# Patient Record
Sex: MALE | Race: WHITE | URBAN - NONMETROPOLITAN AREA
[De-identification: names, ages, dates, MRNs, and addresses within clinical notes are randomized per-mention and may not be internally consistent; named-entity substitution may affect disease eponyms.]

---

## 2018-05-19 ENCOUNTER — APPOINTMENT (OUTPATIENT)
Dept: CT IMAGING | Facility: OTHER | Age: 73
End: 2018-05-19
Payer: MEDICARE

## 2018-05-19 ENCOUNTER — APPOINTMENT (OUTPATIENT)
Dept: GENERAL RADIOLOGY | Facility: OTHER | Age: 73
End: 2018-05-19
Payer: MEDICARE

## 2018-05-19 ENCOUNTER — HOSPITAL ENCOUNTER (EMERGENCY)
Facility: OTHER | Age: 73
Discharge: OTHER INSTITUTION | End: 2018-05-19
Payer: MEDICARE

## 2018-05-19 ENCOUNTER — HOSPITAL ENCOUNTER (INPATIENT)
Facility: CLINIC | Age: 73
LOS: 2 days | Discharge: HOME | DRG: 071 | End: 2018-05-21
Attending: HOSPITALIST | Admitting: HOSPITALIST
Payer: MEDICARE

## 2018-05-19 VITALS
RESPIRATION RATE: 16 BRPM | DIASTOLIC BLOOD PRESSURE: 108 MMHG | WEIGHT: 226 LBS | HEIGHT: 68 IN | TEMPERATURE: 101.3 F | HEART RATE: 94 BPM | BODY MASS INDEX: 34.25 KG/M2 | SYSTOLIC BLOOD PRESSURE: 152 MMHG | OXYGEN SATURATION: 97 %

## 2018-05-19 DIAGNOSIS — G93.40 ENCEPHALOPATHY: Primary | ICD-10-CM

## 2018-05-19 DIAGNOSIS — R41.82 ALTERED MENTAL STATUS, UNSPECIFIED ALTERED MENTAL STATUS TYPE: ICD-10-CM

## 2018-05-19 DIAGNOSIS — R53.81 PHYSICAL DECONDITIONING: ICD-10-CM

## 2018-05-19 DIAGNOSIS — I63.9 CEREBROVASCULAR ACCIDENT (CVA), UNSPECIFIED MECHANISM (H): ICD-10-CM

## 2018-05-19 LAB
ALBUMIN UR-MCNC: NEGATIVE MG/DL
ANION GAP SERPL CALCULATED.3IONS-SCNC: 10 MMOL/L (ref 3–14)
APPEARANCE UR: CLEAR
BASOPHILS # BLD AUTO: 0.1 10E9/L (ref 0–0.2)
BASOPHILS NFR BLD AUTO: 0.7 %
BILIRUB UR QL STRIP: NEGATIVE
BUN SERPL-MCNC: 16 MG/DL (ref 7–25)
CALCIUM SERPL-MCNC: 9.8 MG/DL (ref 8.6–10.3)
CHLORIDE SERPL-SCNC: 105 MMOL/L (ref 98–107)
CO2 SERPL-SCNC: 23 MMOL/L (ref 21–31)
COLOR UR AUTO: YELLOW
CREAT SERPL-MCNC: 1.03 MG/DL (ref 0.7–1.3)
DIFFERENTIAL METHOD BLD: ABNORMAL
EOSINOPHIL # BLD AUTO: 0 10E9/L (ref 0–0.7)
EOSINOPHIL NFR BLD AUTO: 0.4 %
ERYTHROCYTE [DISTWIDTH] IN BLOOD BY AUTOMATED COUNT: 15.6 % (ref 10–15)
GFR SERPL CREATININE-BSD FRML MDRD: 71 ML/MIN/1.7M2
GLUCOSE SERPL-MCNC: 97 MG/DL (ref 70–105)
GLUCOSE UR STRIP-MCNC: NEGATIVE MG/DL
HCT VFR BLD AUTO: 34.8 % (ref 40–53)
HGB BLD-MCNC: 11.5 G/DL (ref 13.3–17.7)
HGB UR QL STRIP: NEGATIVE
IMM GRANULOCYTES # BLD: 0 10E9/L (ref 0–0.4)
IMM GRANULOCYTES NFR BLD: 0.2 %
KETONES UR STRIP-MCNC: ABNORMAL MG/DL
LEUKOCYTE ESTERASE UR QL STRIP: NEGATIVE
LYMPHOCYTES # BLD AUTO: 1.1 10E9/L (ref 0.8–5.3)
LYMPHOCYTES NFR BLD AUTO: 12.6 %
MCH RBC QN AUTO: 26.9 PG (ref 26.5–33)
MCHC RBC AUTO-ENTMCNC: 33 G/DL (ref 31.5–36.5)
MCV RBC AUTO: 82 FL (ref 78–100)
MONOCYTES # BLD AUTO: 0.5 10E9/L (ref 0–1.3)
MONOCYTES NFR BLD AUTO: 6.2 %
NEUTROPHILS # BLD AUTO: 6.7 10E9/L (ref 1.6–8.3)
NEUTROPHILS NFR BLD AUTO: 79.9 %
NITRATE UR QL: NEGATIVE
PH UR STRIP: NEGATIVE PH (ref 5–7)
PLATELET # BLD AUTO: 265 10E9/L (ref 150–450)
POTASSIUM SERPL-SCNC: 3.3 MMOL/L (ref 3.5–5.1)
RBC # BLD AUTO: 4.27 10E12/L (ref 4.4–5.9)
SODIUM SERPL-SCNC: 138 MMOL/L (ref 134–144)
SOURCE: ABNORMAL
SP GR UR STRIP: 1.02 (ref 1–1.03)
UROBILINOGEN UR STRIP-ACNC: NEGATIVE EU/DL (ref 0.2–1)
WBC # BLD AUTO: 8.4 10E9/L (ref 4–11)

## 2018-05-19 PROCEDURE — 81003 URINALYSIS AUTO W/O SCOPE: CPT

## 2018-05-19 PROCEDURE — 80048 BASIC METABOLIC PNL TOTAL CA: CPT

## 2018-05-19 PROCEDURE — 99285 EMERGENCY DEPT VISIT HI MDM: CPT | Mod: Z6

## 2018-05-19 PROCEDURE — 85025 COMPLETE CBC W/AUTO DIFF WBC: CPT

## 2018-05-19 PROCEDURE — 99285 EMERGENCY DEPT VISIT HI MDM: CPT | Mod: 25

## 2018-05-19 PROCEDURE — 93010 ELECTROCARDIOGRAM REPORT: CPT | Performed by: INTERNAL MEDICINE

## 2018-05-19 PROCEDURE — 96376 TX/PRO/DX INJ SAME DRUG ADON: CPT

## 2018-05-19 PROCEDURE — 12000000 ZZH R&B MED SURG/OB

## 2018-05-19 PROCEDURE — 96375 TX/PRO/DX INJ NEW DRUG ADDON: CPT

## 2018-05-19 PROCEDURE — 99223 1ST HOSP IP/OBS HIGH 75: CPT | Mod: AI | Performed by: HOSPITALIST

## 2018-05-19 PROCEDURE — 71046 X-RAY EXAM CHEST 2 VIEWS: CPT

## 2018-05-19 PROCEDURE — 96366 THER/PROPH/DIAG IV INF ADDON: CPT

## 2018-05-19 PROCEDURE — 93005 ELECTROCARDIOGRAM TRACING: CPT

## 2018-05-19 PROCEDURE — 25000132 ZZH RX MED GY IP 250 OP 250 PS 637

## 2018-05-19 PROCEDURE — 25000128 H RX IP 250 OP 636

## 2018-05-19 PROCEDURE — 36415 COLL VENOUS BLD VENIPUNCTURE: CPT

## 2018-05-19 PROCEDURE — 70450 CT HEAD/BRAIN W/O DYE: CPT

## 2018-05-19 PROCEDURE — 96365 THER/PROPH/DIAG IV INF INIT: CPT

## 2018-05-19 PROCEDURE — 96361 HYDRATE IV INFUSION ADD-ON: CPT

## 2018-05-19 RX ORDER — AMOXICILLIN 250 MG
2 CAPSULE ORAL 2 TIMES DAILY PRN
Status: DISCONTINUED | OUTPATIENT
Start: 2018-05-19 | End: 2018-05-22 | Stop reason: HOSPADM

## 2018-05-19 RX ORDER — ONDANSETRON 2 MG/ML
4 INJECTION INTRAMUSCULAR; INTRAVENOUS EVERY 6 HOURS PRN
Status: DISCONTINUED | OUTPATIENT
Start: 2018-05-19 | End: 2018-05-22 | Stop reason: HOSPADM

## 2018-05-19 RX ORDER — LIDOCAINE 40 MG/G
CREAM TOPICAL
Status: DISCONTINUED | OUTPATIENT
Start: 2018-05-19 | End: 2018-05-22 | Stop reason: HOSPADM

## 2018-05-19 RX ORDER — LABETALOL HYDROCHLORIDE 5 MG/ML
10-40 INJECTION, SOLUTION INTRAVENOUS EVERY 10 MIN PRN
Status: DISCONTINUED | OUTPATIENT
Start: 2018-05-19 | End: 2018-05-22 | Stop reason: HOSPADM

## 2018-05-19 RX ORDER — HYDRALAZINE HYDROCHLORIDE 20 MG/ML
10-20 INJECTION INTRAMUSCULAR; INTRAVENOUS
Status: DISCONTINUED | OUTPATIENT
Start: 2018-05-19 | End: 2018-05-22 | Stop reason: HOSPADM

## 2018-05-19 RX ORDER — ATORVASTATIN CALCIUM 40 MG/1
40 TABLET, FILM COATED ORAL
Status: DISCONTINUED | OUTPATIENT
Start: 2018-05-20 | End: 2018-05-19

## 2018-05-19 RX ORDER — PROCHLORPERAZINE MALEATE 5 MG
5 TABLET ORAL EVERY 6 HOURS PRN
Status: DISCONTINUED | OUTPATIENT
Start: 2018-05-19 | End: 2018-05-20

## 2018-05-19 RX ORDER — AMOXICILLIN 250 MG
1 CAPSULE ORAL 2 TIMES DAILY PRN
Status: DISCONTINUED | OUTPATIENT
Start: 2018-05-19 | End: 2018-05-22 | Stop reason: HOSPADM

## 2018-05-19 RX ORDER — POTASSIUM CHLORIDE 7.45 MG/ML
10 INJECTION INTRAVENOUS
Status: DISCONTINUED | OUTPATIENT
Start: 2018-05-19 | End: 2018-05-22 | Stop reason: HOSPADM

## 2018-05-19 RX ORDER — ACETAMINOPHEN 325 MG/1
650 TABLET ORAL ONCE
Status: COMPLETED | OUTPATIENT
Start: 2018-05-19 | End: 2018-05-19

## 2018-05-19 RX ORDER — BISACODYL 10 MG
10 SUPPOSITORY, RECTAL RECTAL DAILY PRN
Status: DISCONTINUED | OUTPATIENT
Start: 2018-05-19 | End: 2018-05-22 | Stop reason: HOSPADM

## 2018-05-19 RX ORDER — LACTULOSE 10 G/15ML
30 SOLUTION ORAL DAILY
COMMUNITY

## 2018-05-19 RX ORDER — ASPIRIN 325 MG
325 TABLET ORAL DAILY
Status: ON HOLD | COMMUNITY
End: 2018-05-21

## 2018-05-19 RX ORDER — POTASSIUM CHLORIDE 29.8 MG/ML
20 INJECTION INTRAVENOUS
Status: DISCONTINUED | OUTPATIENT
Start: 2018-05-19 | End: 2018-05-22 | Stop reason: HOSPADM

## 2018-05-19 RX ORDER — HYDROCHLOROTHIAZIDE 12.5 MG/1
25 CAPSULE ORAL DAILY
COMMUNITY

## 2018-05-19 RX ORDER — NALOXONE HYDROCHLORIDE 0.4 MG/ML
.1-.4 INJECTION, SOLUTION INTRAMUSCULAR; INTRAVENOUS; SUBCUTANEOUS
Status: DISCONTINUED | OUTPATIENT
Start: 2018-05-19 | End: 2018-05-22 | Stop reason: HOSPADM

## 2018-05-19 RX ORDER — POLYETHYLENE GLYCOL 3350 17 G/17G
17 POWDER, FOR SOLUTION ORAL DAILY PRN
Status: DISCONTINUED | OUTPATIENT
Start: 2018-05-19 | End: 2018-05-22 | Stop reason: HOSPADM

## 2018-05-19 RX ORDER — SODIUM CHLORIDE 9 MG/ML
1000 INJECTION, SOLUTION INTRAVENOUS CONTINUOUS
Status: DISCONTINUED | OUTPATIENT
Start: 2018-05-19 | End: 2018-05-19 | Stop reason: HOSPADM

## 2018-05-19 RX ORDER — ACETAMINOPHEN 325 MG/1
650 TABLET ORAL EVERY 4 HOURS PRN
Status: DISCONTINUED | OUTPATIENT
Start: 2018-05-19 | End: 2018-05-22 | Stop reason: HOSPADM

## 2018-05-19 RX ORDER — NICOTINE 21 MG/24HR
1 PATCH, TRANSDERMAL 24 HOURS TRANSDERMAL EVERY 24 HOURS
COMMUNITY

## 2018-05-19 RX ORDER — POTASSIUM CHLORIDE 1500 MG/1
20-40 TABLET, EXTENDED RELEASE ORAL
Status: DISCONTINUED | OUTPATIENT
Start: 2018-05-19 | End: 2018-05-22 | Stop reason: HOSPADM

## 2018-05-19 RX ORDER — OXYBUTYNIN CHLORIDE 10 MG/1
10 TABLET, EXTENDED RELEASE ORAL DAILY
COMMUNITY

## 2018-05-19 RX ORDER — ASPIRIN 300 MG/1
300 SUPPOSITORY RECTAL DAILY
Status: DISCONTINUED | OUTPATIENT
Start: 2018-05-20 | End: 2018-05-20

## 2018-05-19 RX ORDER — SUCRALFATE 1 G/1
1 TABLET ORAL 4 TIMES DAILY
COMMUNITY

## 2018-05-19 RX ORDER — LORAZEPAM 2 MG/ML
1 INJECTION INTRAMUSCULAR ONCE
Status: COMPLETED | OUTPATIENT
Start: 2018-05-19 | End: 2018-05-19

## 2018-05-19 RX ORDER — SODIUM CHLORIDE 9 MG/ML
INJECTION, SOLUTION INTRAVENOUS CONTINUOUS
Status: DISCONTINUED | OUTPATIENT
Start: 2018-05-20 | End: 2018-05-22 | Stop reason: HOSPADM

## 2018-05-19 RX ORDER — LORAZEPAM 2 MG/ML
2 INJECTION INTRAMUSCULAR ONCE
Status: COMPLETED | OUTPATIENT
Start: 2018-05-19 | End: 2018-05-19

## 2018-05-19 RX ORDER — ACETAMINOPHEN 650 MG/1
650 SUPPOSITORY RECTAL EVERY 4 HOURS PRN
Status: DISCONTINUED | OUTPATIENT
Start: 2018-05-19 | End: 2018-05-22 | Stop reason: HOSPADM

## 2018-05-19 RX ORDER — ATORVASTATIN CALCIUM 80 MG/1
80 TABLET, FILM COATED ORAL
Status: DISCONTINUED | OUTPATIENT
Start: 2018-05-20 | End: 2018-05-20

## 2018-05-19 RX ORDER — PROCHLORPERAZINE 25 MG
12.5 SUPPOSITORY, RECTAL RECTAL EVERY 12 HOURS PRN
Status: DISCONTINUED | OUTPATIENT
Start: 2018-05-19 | End: 2018-05-20

## 2018-05-19 RX ORDER — POTASSIUM CHLORIDE 7.45 MG/ML
10 INJECTION INTRAVENOUS CONTINUOUS
Status: DISCONTINUED | OUTPATIENT
Start: 2018-05-19 | End: 2018-05-19 | Stop reason: HOSPADM

## 2018-05-19 RX ORDER — HYDROCODONE BITARTRATE AND ACETAMINOPHEN 5; 325 MG/1; MG/1
1 TABLET ORAL EVERY 6 HOURS PRN
COMMUNITY

## 2018-05-19 RX ORDER — CARBIDOPA AND LEVODOPA 25; 100 MG/1; MG/1
1.5 TABLET ORAL 3 TIMES DAILY
COMMUNITY

## 2018-05-19 RX ORDER — POTASSIUM CL/LIDO/0.9 % NACL 10MEQ/0.1L
10 INTRAVENOUS SOLUTION, PIGGYBACK (ML) INTRAVENOUS
Status: DISCONTINUED | OUTPATIENT
Start: 2018-05-19 | End: 2018-05-22 | Stop reason: HOSPADM

## 2018-05-19 RX ORDER — DIPHENOXYLATE HCL/ATROPINE 2.5-.025MG
2 TABLET ORAL 4 TIMES DAILY PRN
Status: ON HOLD | COMMUNITY
End: 2018-05-21

## 2018-05-19 RX ORDER — POTASSIUM CHLORIDE 1.5 G/1.58G
20-40 POWDER, FOR SOLUTION ORAL
Status: DISCONTINUED | OUTPATIENT
Start: 2018-05-19 | End: 2018-05-22 | Stop reason: HOSPADM

## 2018-05-19 RX ORDER — ONDANSETRON 4 MG/1
4 TABLET, ORALLY DISINTEGRATING ORAL EVERY 6 HOURS PRN
Status: DISCONTINUED | OUTPATIENT
Start: 2018-05-19 | End: 2018-05-22 | Stop reason: HOSPADM

## 2018-05-19 RX ADMIN — SODIUM CHLORIDE 1000 ML: 900 INJECTION, SOLUTION INTRAVENOUS at 15:25

## 2018-05-19 RX ADMIN — LORAZEPAM 2 MG: 2 INJECTION INTRAMUSCULAR; INTRAVENOUS at 16:29

## 2018-05-19 RX ADMIN — POTASSIUM CHLORIDE 10 MEQ: 10 INJECTION, SOLUTION INTRAVENOUS at 16:36

## 2018-05-19 RX ADMIN — ACETAMINOPHEN 650 MG: 325 TABLET, FILM COATED ORAL at 15:40

## 2018-05-19 RX ADMIN — LORAZEPAM 1 MG: 2 INJECTION INTRAMUSCULAR; INTRAVENOUS at 15:40

## 2018-05-19 RX ADMIN — SODIUM CHLORIDE 1000 ML: 900 INJECTION, SOLUTION INTRAVENOUS at 16:35

## 2018-05-19 ASSESSMENT — ENCOUNTER SYMPTOMS
DIZZINESS: 0
DIARRHEA: 0
FEVER: 1
HEADACHES: 0
EYES NEGATIVE: 1
TREMORS: 1
ABDOMINAL PAIN: 0
APPETITE CHANGE: 1
NAUSEA: 0
LIGHT-HEADEDNESS: 0
CHEST TIGHTNESS: 0
ACTIVITY CHANGE: 1
COUGH: 0
VOMITING: 0

## 2018-05-19 ASSESSMENT — VISUAL ACUITY: OU: BLURRED VISION

## 2018-05-19 NOTE — IP AVS SNAPSHOT
MRN:[id number]                      After Visit Summary   5/19/2018    Brent Vega    MRN: [id number]           Thank you!     Thank you for choosing [facility] for your care. Our goal is always to provide you with excellent care. Hearing back from our patients is one way we can continue to improve our services. Please take a few minutes to complete the written survey that you may receive in the mail after you visit with us. Thank you!        Patient Information     Date Of Birth          1945        Designated Caregiver       Most Recent Value    Caregiver    Will someone help with your care after discharge? yes    Name of designated caregiver Irish Vgea    Phone number of caregiver see facesheet    Caregiver address see facesheet      About your hospital stay     You were admitted on:  May 19, 2018 You last received care in the:  [facility] [address] [facility] [place]    You were discharged on:  May 21, 2018        Reason for your hospital stay       Confusion.                  Who to Call     For medical emergencies, please call 911.  For non-urgent questions about your medical care, please call your primary care provider or clinic, [phone]          Attending Provider     Provider Specialty    Wili Ruiz MD Internal Medicine       Primary Care Provider Office Phone # Fax #    Jerzy Go [phone]      After Care Instructions     Activity       Your activity upon discharge: activity as tolerated            Diet       Follow this diet upon discharge: Orders Placed This Encounter      Regular diet                  Follow-up Appointments     Follow-up and recommended labs and tests        Follow up with primary care provider, JERZY GO, within 7 days for hospital follow- up.  The following labs/tests are recommended: cbc/cmp.    Follow up with Neurology as directed, next available.                  Your next 10 appointments already scheduled   "   May 22, 2018  8:30 AM CDT   Inpatient Meal Follow Up Therapy with Charis Alba SLP   [facility] ([facility])    [address], [address]  [place] [address] [phone]              Additional Services     Physical Therapy Referral       *This therapy referral will be filtered to a centralized scheduling office at [facility] and the patient will receive a call to schedule an appointment at a [facility] location most convenient for them. *     [facility] provides Physical Therapy evaluation and treatment and many specialty services across the [facility] system.  If requesting a specialty program, please choose from the list below.    If you have not heard from the scheduling office within 2 business days, please call [phone] for all locations, with the exception of [place], please call [phone] and [facility], please call [phone]  Treatment: Evaluation & Treatment  Special Instructions/Modalities: Falls risk with balance deficits, deficits with safety awareness    Please be aware that coverage of these services is subject to the terms and limitations of your health insurance plan.  Call member services at your health plan with any benefit or coverage questions.      **Note to Provider:  If you are referring outside of [facility] for the therapy appointment, please list the name of the location in the \"special instructions\" above, print the referral and give to the patient to schedule the appointment.                  Pending Results     Date and Time Order Name Status Description    5/19/2018 2351 EKG 12-lead, tracing only Preliminary     5/19/2018 1731 EKG 12 lead In process             Statement of Approval     Ordered          05/21/18 1402  I have reviewed and agree with all the recommendations and orders detailed in this document.  EFFECTIVE NOW     Approved and electronically signed by:  Tony Gupta " "Nathalia, DO             Admission Information     Date & Time Provider Department Dept. Phone    2018 Wili Ruiz MD [address] [facility] [phone]      Your Vitals Were     Blood Pressure Pulse Temperature Respirations Weight Pulse Oximetry    160/94 67 97.9  F (36.6  C) (Oral) 16 77.4 kg (170 lb 10.2 oz) 95%    BMI (Body Mass Index)                   25.95 kg/m2           MyChart Information     [facility] lets you send messages to your doctor, view your test results, renew your prescriptions, schedule appointments and more. To sign up, go to [email or web address] . Click on \"Log in\" on the left side of the screen, which will take you to the Welcome page. Then click on \"Sign up Now\" on the right side of the page.     You will be asked to enter the access code listed below, as well as some personal information. Please follow the directions to create your username and password.     Your access code is: [email or web address]  Expires: 2018  6:36 PM     Your access code will  in 90 days. If you need help or a new code, please call your [facility] clinic or [phone].        Care EveryWhere ID     This is your Care EveryWhere ID. This could be used by other organizations to access your [facility] medical records  [id number]        Equal Access to Services     CHRISTY NORTON AH: Hadii elana ayono Soemery, waaxda luqadaha, qaybta kaalmada ebony, aisha mccarty . So [facility] [phone].    ATENCIÓN: Si habla español, tiene a collazo disposición servicios gratuitos de asistencia lingüística. Miranda al [phone].    We comply with applicable federal civil rights laws and [place] laws. We do not discriminate on the basis of race, color, national origin, age, disability, sex, sexual orientation, or gender identity.               Review of your medicines      START taking        Dose / Directions    aspirin 81 MG chewable tablet   Replaces:  aspirin 325 MG tablet        " Dose:  81 mg   Start taking on:  5/22/2018   Take 1 tablet (81 mg) by mouth daily   Quantity:  36 tablet   Refills:  0       clopidogrel 75 MG tablet   Commonly known as:  PLAVIX        Dose:  75 mg   Start taking on:  5/22/2018   Take 1 tablet (75 mg) by mouth daily   Quantity:  30 tablet   Refills:  0       rosuvastatin 40 MG tablet   Commonly known as:  CRESTOR        Dose:  40 mg   Start taking on:  5/22/2018   Take 1 tablet (40 mg) by mouth daily   Quantity:  30 tablet   Refills:  0         CONTINUE these medicines which have NOT CHANGED        Dose / Directions    ALLOPURINOL PO        Dose:  300 mg   Take 300 mg by mouth daily   Refills:  0       BENZTROPINE MESYLATE PO        Dose:  2 mg   Take 2 mg by mouth 2 times daily   Refills:  0       carbidopa-levodopa  MG per tablet   Commonly known as:  SINEMET        Dose:  1.5 tablet   Take 1.5 tablets by mouth 3 times daily   Refills:  0       hydrochlorothiazide 12.5 MG capsule   Commonly known as:  MICROZIDE        Dose:  25 mg   Take 25 mg by mouth daily   Refills:  0       HYDROcodone-acetaminophen 5-325 MG per tablet   Commonly known as:  NORCO        Dose:  1 tablet   Take 1 tablet by mouth every 6 hours as needed for severe pain (1/2-1tab bid prn)   Refills:  0       lactulose 10 GM/15ML solution   Commonly known as:  CHRONULAC        Dose:  30 g   Take 30 g by mouth daily   Refills:  0       LOSARTAN POTASSIUM PO        Dose:  25 mg   Take 25 mg by mouth daily   Refills:  0       MAGNESIUM OXIDE PO        Dose:  100 mg   Take 100 mg by mouth daily   Refills:  0       nicotine 21 MG/24HR 24 hr patch   Commonly known as:  NICODERM CQ        Dose:  1 patch   Place 1 patch onto the skin every 24 hours   Refills:  0       oxybutynin 10 MG 24 hr tablet   Commonly known as:  DITROPAN-XL        Dose:  10 mg   Take 10 mg by mouth daily   Refills:  0       PREVACID PO        Dose:  30 mg   Take 30 mg by mouth every morning (before breakfast)   Refills:  0        sucralfate 1 GM tablet   Commonly known as:  CARAFATE        Dose:  1 g   Take 1 g by mouth 4 times daily   Refills:  0       TAMSULOSIN HCL PO        Dose:  0.4 mg   Take 0.4 mg by mouth At Bedtime   Refills:  0       TOPROL XL PO        Dose:  100 mg   Take 100 mg by mouth daily   Refills:  0       TRAMADOL HCL PO        Dose:  50 mg   Take 50 mg by mouth 2 times daily as needed for headaches or moderate to severe pain   Refills:  0       TYLENOL PO        Dose:  325 mg   Take 325 mg by mouth every 6 hours as needed   Refills:  0       ZOFRAN ODT PO        Dose:  4 mg   Take 4 mg by mouth every 8 hours as needed   Refills:  0         STOP taking     aspirin 325 MG tablet   Replaced by:  aspirin 81 MG chewable tablet           diphenoxylate-atropine 2.5-0.025 MG per tablet   Commonly known as:  LOMOTIL           LIPITOR PO           MS CONTIN PO           XANAX PO                Where to get your medicines      These medications were sent to [facility] [place], [place] - [address]  [address], [facility] [address]     Phone:  [phone]     aspirin 81 MG chewable tablet    clopidogrel 75 MG tablet    rosuvastatin 40 MG tablet                Protect others around you: Learn how to safely use, store and throw away your medicines at www.disposemymeds.org.        Information about OPIOIDS     PRESCRIPTION OPIOIDS: WHAT YOU NEED TO KNOW   You have a prescription for an opioid (narcotic) pain medicine. Opioids can cause addiction. If you have a history of chemical dependency of any type, you are at a higher risk of becoming addicted to opioids. Only take this medicine after all other options have been tried. Take it for as short a time and as few doses as possible.     Do not:    Drive. If you drive while taking these medicines, you could be arrested for driving under the influence (DUI).    Operate heavy machinery    Do any other dangerous activities while taking these medicines.      Drink any alcohol while taking these medicines.      Take with any other medicines that contain acetaminophen. Read all labels carefully. Look for the word  acetaminophen  or  Tylenol.  Ask your pharmacist if you have questions or are unsure.    Store your pills in a secure place, locked if possible. We will not replace any lost or stolen medicine. If you don t finish your medicine, please throw away (dispose) as directed by your pharmacist. The [place] Pollution [employer] has more information about safe disposal: https://www.pca.[facility].[place].us/living-green/managing-unwanted-medications    All opioids tend to cause constipation. Drink plenty of water and eat foods that have a lot of fiber, such as fruits, vegetables, prune juice, apple juice and high-fiber cereal. Take a laxative (Miralax, milk of magnesia, Colace, Senna) if you don t move your bowels at least every other day.              Medication List: This is a list of all your medications and when to take them. Check marks below indicate your daily home schedule. Keep this list as a reference.      Medications           Morning Afternoon Evening Bedtime As Needed    ALLOPURINOL PO   Take 300 mg by mouth daily   Last time this was given:  300 mg on 5/21/2018  9:41 AM   Next Dose Due:  Tomorrow morning.                                   aspirin 81 MG chewable tablet   Take 1 tablet (81 mg) by mouth daily   Start taking on:  5/22/2018   Last time this was given:  81 mg on 5/21/2018  9:41 AM   Next Dose Due:  Tomorrow morning.                                   BENZTROPINE MESYLATE PO   Take 2 mg by mouth 2 times daily   Next Dose Due:  Tonight at bedtime.                                      carbidopa-levodopa  MG per tablet   Commonly known as:  SINEMET   Take 1.5 tablets by mouth 3 times daily   Last time this was given:  1 tablet, 1 half-tab on 5/21/2018  3:58 PM   Next Dose Due:  Tonight at bedtime.                                          clopidogrel 75 MG tablet   Commonly known as:  PLAVIX   Take 1 tablet (75 mg) by mouth daily   Start taking on:  5/22/2018   Last time this was given:  75 mg on 5/21/2018  9:40 AM   Next Dose Due:  Tomorrow morning.                                   hydrochlorothiazide 12.5 MG capsule   Commonly known as:  MICROZIDE   Take 25 mg by mouth daily   Next Dose Due:  Tomorrow morning.                                   HYDROcodone-acetaminophen 5-325 MG per tablet   Commonly known as:  NORCO   Take 1 tablet by mouth every 6 hours as needed for severe pain (1/2-1tab bid prn)   Next Dose Due:  Anytime today as needed for pain.                            Anytime today as needed for pain.       lactulose 10 GM/15ML solution   Commonly known as:  CHRONULAC   Take 30 g by mouth daily   Last time this was given:  30 g on 5/21/2018  9:45 AM   Next Dose Due:  Tomorrow morning.                                   LOSARTAN POTASSIUM PO   Take 25 mg by mouth daily   Next Dose Due:  Tomorrow morning.                                   MAGNESIUM OXIDE PO   Take 100 mg by mouth daily   Next Dose Due:  Tomorrow morning.                                   nicotine 21 MG/24HR 24 hr patch   Commonly known as:  NICODERM CQ   Place 1 patch onto the skin every 24 hours   Next Dose Due:  Anytime today as needed to stop smoking, do not smoke while patch is in place.                            Anytime today as needed to stop smoking, do not smoke while patch is in place.         oxybutynin 10 MG 24 hr tablet   Commonly known as:  DITROPAN-XL   Take 10 mg by mouth daily   Next Dose Due:  Tonight at bedtime.                                   PREVACID PO   Take 30 mg by mouth every morning (before breakfast)   Next Dose Due:  Tomorrow morning before breakfast.                                   rosuvastatin 40 MG tablet   Commonly known as:  CRESTOR   Take 1 tablet (40 mg) by mouth daily   Start taking on:  5/22/2018   Last time this was given:  40 mg on  5/21/2018  9:40 AM   Next Dose Due:  Tomorrow morning.                                   sucralfate 1 GM tablet   Commonly known as:  CARAFATE   Take 1 g by mouth 4 times daily   Next Dose Due:  Tonight at bedtime.                                            TAMSULOSIN HCL PO   Take 0.4 mg by mouth At Bedtime   Last time this was given:  0.4 mg on 5/20/2018  9:47 PM   Next Dose Due:  Tonight at bedtime.                                   TOPROL XL PO   Take 100 mg by mouth daily   Next Dose Due:  Tomorrow morning.                                   TRAMADOL HCL PO   Take 50 mg by mouth 2 times daily as needed for headaches or moderate to severe pain   Next Dose Due:  Anytime today as needed for pain.                            Anytime today as needed for pain.       TYLENOL PO   Take 325 mg by mouth every 6 hours as needed   Next Dose Due:  Anytime today as needed for pain.                            Anytime today as needed for pain.       ZOFRAN ODT PO   Take 4 mg by mouth every 8 hours as needed   Next Dose Due:  Anytime today as needed for nausea.                         Anytime today as needed for nausea.

## 2018-05-19 NOTE — ED TRIAGE NOTES
Patient brought in by friend who states he has Parkinson's and he has been more jittery today and just not feeling right.  States he has some chest pain that radiated up to his left shoulder.    COLUMBIA-SUICIDE SEVERITY RATING SCALE   Screen with Triage Points for Emergency Department      Ask questions that are bolded and underlined.   Past  month   Ask Questions 1 and 2 YES NO   1)  Have you wished you were dead or wished you could go to sleep and not wake up?   x   2)  Have you actually had any thoughts of killing yourself?   x   If YES to 2, ask questions 3, 4, 5, and 6.  If NO to 2, go directly to question 6.   3)  Have you been thinking about how you might do this?   E.g.  I thought about taking an overdose but I never made a specific plan as to when where or how I would actually do it .and I would never go through with it.    x   4)  Have you had these thoughts and had some intention of acting on them?   As opposed to  I have the thoughts but I definitely will not do anything about them.    x   5)  Have you started to work out or worked out the details of how to kill yourself? Do you intend to carry out this plan?      6)  Have you ever done anything, started to do anything, or prepared to do anything to end your life?  Examples: Collected pills, obtained a gun, gave away valuables, wrote a will or suicide note, took out pills but didn t swallow any, held a gun but changed your mind or it was grabbed from your hand, went to the roof but didn t jump; or actually took pills, tried to shoot yourself, cut yourself, tried to hang yourself, etc.    If YES, ask: Was this within the past three months?  Lifetime     x    Past 3 Months     x   Item 1:  Behavioral Health Referral at Discharge  Item 2:  Behavioral Health Referral at Discharge   Item 3:  Behavioral Health Consult (Psychiatric Nurse/) and consider Patient Safety Precautions  Item 4:  Immediate Notification of Physician and/or Behavioral  Health and Patient Safety Precautions   Item 5:  Immediate Notification of Physician and/or Behavioral Health and Patient Safety Precautions  Item 6:  Over 3 months ago: Behavioral Health Consult (Psychiatric Nurse/) and consider Patient Safety Precautions  OR  Item 6:  3 months ago or less: Immediate Notification of Physician and/or Behavioral Health and Patient Safety Precautions

## 2018-05-19 NOTE — IP AVS SNAPSHOT
[address] [facility]    [facility] [address]    [place] [address]    Phone:  [phone]                                       After Visit Summary   5/19/2018    Brent Vega    MRN: [id number]           After Visit Summary Signature Page     I have received my discharge instructions, and my questions have been answered. I have discussed any challenges I see with this plan with the nurse or doctor.    ..........................................................................................................................................  Patient/Patient Representative Signature      ..........................................................................................................................................  Patient Representative Print Name and Relationship to Patient    ..................................................               ................................................  Date                                            Time    ..........................................................................................................................................  Reviewed by Signature/Title    ...................................................              ..............................................  Date                                                            Time

## 2018-05-19 NOTE — ED PROVIDER NOTES
"  History     Chief Complaint   Patient presents with     Neurologic Problem     The history is provided by a friend.     Brent Vega is a 73 year old male who presents to the ER in the care of a friend who states that he has been more jittery today and has had some left shoulder pain.  He does have a history of Parkinson's disease and has not been\" feeling right\" today.  He seemed to be his more usual self yesterday but woke today with increased tremors.  He denies a headache, difficulty breathing, nausea, vomiting, diarrhea.  He is reporting pain in his upper left chest with movement into his shoulder.  He denies that it is \"chest pain\" and that it goes down his arm or up into his neck.    Problem List:    There are no active problems to display for this patient.       Past Medical History:    History reviewed. No pertinent past medical history.    Past Surgical History:    History reviewed. No pertinent surgical history.    Family History:    No family history on file.    Social History:  Marital Status:  Single [1]  Social History   Substance Use Topics     Smoking status: Current Every Day Smoker     Packs/day: 3.00     Smokeless tobacco: Never Used     Alcohol use Not on file        Medications:      No current outpatient prescriptions on file.      Review of Systems   Unable to perform ROS: Other   Constitutional: Positive for activity change, appetite change and fever.   HENT: Negative.    Eyes: Negative.    Respiratory: Negative for cough and chest tightness.    Cardiovascular: Positive for chest pain.   Gastrointestinal: Negative for abdominal pain, diarrhea, nausea and vomiting.   Genitourinary: Negative.    Musculoskeletal: Positive for gait problem.   Skin: Negative.    Neurological: Positive for tremors. Negative for dizziness, light-headedness and headaches.       Physical Exam   BP: (!) 162/116  Pulse: 94  Temp: 101.7  F (38.7  C)  Resp: 24  Height: 172.7 cm (5' 8\")  Weight: 102.5 kg (226 " lb)  SpO2: 97 %      Physical Exam   Constitutional: He appears well-developed and well-nourished.   HENT:   Head: Normocephalic and atraumatic.   Right Ear: External ear normal.   Left Ear: External ear normal.   Mouth/Throat: Oropharynx is clear and moist.   Eyes: Conjunctivae and EOM are normal. Pupils are equal, round, and reactive to light.   Neck: Normal range of motion. Neck supple.   Cardiovascular: Normal rate and regular rhythm.  Exam reveals no gallop and no friction rub.    No murmur heard.  Pulmonary/Chest: Effort normal and breath sounds normal. No respiratory distress.   Abdominal: Soft. He exhibits distension. There is no tenderness.   Musculoskeletal: Normal range of motion.   Neurological: He is alert. He is disoriented. He displays tremor. No cranial nerve deficit or sensory deficit. GCS eye subscore is 4. GCS verbal subscore is 5. GCS motor subscore is 6.   This patient has a history of Parkinson's disease and according to his wife, is well controlled with medication and does not normally have tremors.  He is quite tremulous on exam.  He is also a bit confused and unreliable with answers.   Nursing note and vitals reviewed.      ED Course     ED Course     Procedures               EKG Interpretation:      Interpreted by TYLOR NI  Time reviewed: 1520  Symptoms at time of EKG: change in tremors   Rhythm: normal sinus   Rate: normal  Axis: normal  Ectopy: none  Conduction: normal  ST Segments/ T Waves: No ST-T wave changes  Q Waves: none  Comparison to prior: No old EKG available    Clinical Impression: normal EKG    Critical Care time:  none  The patient has stroke symptoms:           ED Stroke specific documentation           NIHSS PDF          Protocol PDF     Patient last known well time: 0700  ED Provider first to bedside at: 1500  CT Results received at: 1815  Patient was not treated with TPA due to the following reason(s):  Out of the treatment time window    National Institutes  of Health Stroke Scale (Baseline)  Time Performed: 1815      Score    Level of consciousness: (1)   Not alert; arousable w/ minor stim to obey/answer/respond    LOC questions: (1)   Answers one question correctly    LOC commands: (1)   Performs one task correctly    Best gaze: (0)   Normal    Visual: (0)   No visual loss    Facial palsy: (0)   Normal symmetrical movements    Motor arm (left): tremors    Motor arm (right): tremors    Motor leg (left): termors    Motor leg (right): termors    Limb ataxia: tremors    Sensory: (0)   Normal- no sensory loss    Best language: (0)   Normal- no aphasia    Dysarthria: (1)   Mild to moderate dysarthria    Extinction and inattention: (0)   No abnormality        Total Score:  4; score unreliable due to tremors, hx of Parkinson's      Stroke Mimics were considered (including migraine headache, seizure disorder, hypoglycemia (or hyperglycemia), head or spinal trauma, CNS infection, Toxin ingestion and shock state (e.g. sepsis) .    Evaluation/Treatement was delayed due to: patient tremors: unable to get reliable CT scan. No PMH available as patient is traveling from [place] without a family member.       No results found for this or any previous visit (from the past 24 hour(s)).    Medications   0.9% sodium chloride BOLUS (1,000 mLs Intravenous New Bag 5/19/18 1525)     Followed by   sodium chloride 0.9% infusion (not administered)   acetaminophen (TYLENOL) tablet 650 mg (not administered)   LORazepam (ATIVAN) injection 1 mg (not administered)       Assessments & Plan (with Medical Decision Making)     I have reviewed the nursing notes.    I have reviewed the findings, diagnosis, plan and need for follow up with the patient.  This patient presents to the ER in the care of a friend who has limited information about him.  He states that he seems like his usual self yesterday but woke up this morning complaining of not feeling well and having a fever.  His friend noted that he had a  significant increase in tremors.  Given his history of Parkinson's disease it is impossible for us to know without a reliable family member if this is new for him.  He was noted to have a fever on arrival and a chest x-ray, UA, and white blood cell count were negative.  An LP was not done.  A head CT was obtained and he was noted to have some changes that could be consistent with a stroke.  I contacted the [facility] and spoke with Dr. Best about this patient.  He was able to review the CT scan and suggested the patient be transferred for an MRI.  He suggested the patient be transferred to [facility].  I then spoke to Dr. Ruiz, the hospitalist, who did agree to accept the patient's admission to the hospital and direct his subsequent care.  I contacted his wife in [place] to inform her that he would be transferred.  I did discuss lab findings, x-ray findings with her.  The patient was stable at the time of transfer.    1. Altered mental status, unspecified altered mental status type    2. Cerebrovascular accident (CVA), unspecified mechanism (H)          Margarita Hutchinson, RN, PhD, CNP  Nurse Practitioner  [facility] Emergency Department  5/19/2018   [facility] CenterMargarita Long APRN CNP  05/19/18 3542

## 2018-05-20 ENCOUNTER — APPOINTMENT (OUTPATIENT)
Dept: MRI IMAGING | Facility: CLINIC | Age: 73
DRG: 071 | End: 2018-05-20
Attending: HOSPITALIST
Payer: MEDICARE

## 2018-05-20 ENCOUNTER — APPOINTMENT (OUTPATIENT)
Dept: SPEECH THERAPY | Facility: CLINIC | Age: 73
DRG: 071 | End: 2018-05-20
Attending: HOSPITALIST
Payer: MEDICARE

## 2018-05-20 ENCOUNTER — APPOINTMENT (OUTPATIENT)
Dept: OCCUPATIONAL THERAPY | Facility: CLINIC | Age: 73
DRG: 071 | End: 2018-05-20
Attending: HOSPITALIST
Payer: MEDICARE

## 2018-05-20 ENCOUNTER — APPOINTMENT (OUTPATIENT)
Dept: PHYSICAL THERAPY | Facility: CLINIC | Age: 73
DRG: 071 | End: 2018-05-20
Attending: HOSPITALIST
Payer: MEDICARE

## 2018-05-20 ENCOUNTER — APPOINTMENT (OUTPATIENT)
Dept: ULTRASOUND IMAGING | Facility: CLINIC | Age: 73
DRG: 071 | End: 2018-05-20
Attending: PSYCHIATRY & NEUROLOGY
Payer: MEDICARE

## 2018-05-20 LAB
ALBUMIN SERPL-MCNC: 3.1 G/DL (ref 3.4–5)
ALP SERPL-CCNC: 95 U/L (ref 40–150)
ALT SERPL W P-5'-P-CCNC: 12 U/L (ref 0–70)
ANION GAP SERPL CALCULATED.3IONS-SCNC: 9 MMOL/L (ref 3–14)
AST SERPL W P-5'-P-CCNC: 17 U/L (ref 0–45)
BILIRUB DIRECT SERPL-MCNC: 0.2 MG/DL (ref 0–0.2)
BILIRUB SERPL-MCNC: 0.7 MG/DL (ref 0.2–1.3)
BUN SERPL-MCNC: 11 MG/DL (ref 7–30)
CALCIUM SERPL-MCNC: 8.6 MG/DL (ref 8.5–10.1)
CHLORIDE SERPL-SCNC: 110 MMOL/L (ref 94–109)
CHOLEST SERPL-MCNC: 200 MG/DL
CO2 SERPL-SCNC: 22 MMOL/L (ref 20–32)
CREAT SERPL-MCNC: 0.78 MG/DL (ref 0.66–1.25)
ERYTHROCYTE [DISTWIDTH] IN BLOOD BY AUTOMATED COUNT: 15.7 % (ref 10–15)
GFR SERPL CREATININE-BSD FRML MDRD: >90 ML/MIN/1.7M2
GLUCOSE BLDC GLUCOMTR-MCNC: 86 MG/DL (ref 70–99)
GLUCOSE SERPL-MCNC: 86 MG/DL (ref 70–99)
GLUCOSE SERPL-MCNC: 88 MG/DL (ref 70–99)
HBA1C MFR BLD: 5.6 % (ref 0–5.6)
HCT VFR BLD AUTO: 34.6 % (ref 40–53)
HDLC SERPL-MCNC: 30 MG/DL
HGB BLD-MCNC: 11.4 G/DL (ref 13.3–17.7)
LDLC SERPL CALC-MCNC: 138 MG/DL
MCH RBC QN AUTO: 26.8 PG (ref 26.5–33)
MCHC RBC AUTO-ENTMCNC: 32.9 G/DL (ref 31.5–36.5)
MCV RBC AUTO: 81 FL (ref 78–100)
NONHDLC SERPL-MCNC: 170 MG/DL
PLATELET # BLD AUTO: 229 10E9/L (ref 150–450)
POTASSIUM SERPL-SCNC: 3.1 MMOL/L (ref 3.4–5.3)
POTASSIUM SERPL-SCNC: 3.6 MMOL/L (ref 3.4–5.3)
PROCALCITONIN SERPL-MCNC: <0.05 NG/ML
PROT SERPL-MCNC: 6.6 G/DL (ref 6.8–8.8)
RBC # BLD AUTO: 4.26 10E12/L (ref 4.4–5.9)
SODIUM SERPL-SCNC: 141 MMOL/L (ref 133–144)
TRIGL SERPL-MCNC: 158 MG/DL
TROPONIN I SERPL-MCNC: 0.42 UG/L (ref 0–0.04)
TROPONIN I SERPL-MCNC: 0.46 UG/L (ref 0–0.04)
WBC # BLD AUTO: 6.4 10E9/L (ref 4–11)

## 2018-05-20 PROCEDURE — 97116 GAIT TRAINING THERAPY: CPT | Mod: GP | Performed by: PHYSICAL THERAPIST

## 2018-05-20 PROCEDURE — 25000132 ZZH RX MED GY IP 250 OP 250 PS 637: Mod: GY | Performed by: HOSPITALIST

## 2018-05-20 PROCEDURE — 25000132 ZZH RX MED GY IP 250 OP 250 PS 637: Mod: GY | Performed by: PSYCHIATRY & NEUROLOGY

## 2018-05-20 PROCEDURE — 84145 PROCALCITONIN (PCT): CPT | Performed by: HOSPITALIST

## 2018-05-20 PROCEDURE — A9270 NON-COVERED ITEM OR SERVICE: HCPCS | Mod: GY | Performed by: HOSPITALIST

## 2018-05-20 PROCEDURE — 82947 ASSAY GLUCOSE BLOOD QUANT: CPT | Performed by: HOSPITALIST

## 2018-05-20 PROCEDURE — 93005 ELECTROCARDIOGRAM TRACING: CPT

## 2018-05-20 PROCEDURE — 83036 HEMOGLOBIN GLYCOSYLATED A1C: CPT | Performed by: HOSPITALIST

## 2018-05-20 PROCEDURE — 99232 SBSQ HOSP IP/OBS MODERATE 35: CPT | Performed by: HOSPITALIST

## 2018-05-20 PROCEDURE — 12000000 ZZH R&B MED SURG/OB

## 2018-05-20 PROCEDURE — 70553 MRI BRAIN STEM W/O & W/DYE: CPT

## 2018-05-20 PROCEDURE — 97535 SELF CARE MNGMENT TRAINING: CPT | Mod: GO | Performed by: REHABILITATION PRACTITIONER

## 2018-05-20 PROCEDURE — A9270 NON-COVERED ITEM OR SERVICE: HCPCS | Performed by: HOSPITALIST

## 2018-05-20 PROCEDURE — 40000893 ZZH STATISTIC PT IP EVAL DEFER: Performed by: PHYSICAL THERAPIST

## 2018-05-20 PROCEDURE — 99223 1ST HOSP IP/OBS HIGH 75: CPT | Performed by: PSYCHIATRY & NEUROLOGY

## 2018-05-20 PROCEDURE — 84132 ASSAY OF SERUM POTASSIUM: CPT | Performed by: HOSPITALIST

## 2018-05-20 PROCEDURE — 00000146 ZZHCL STATISTIC GLUCOSE BY METER IP

## 2018-05-20 PROCEDURE — 70544 MR ANGIOGRAPHY HEAD W/O DYE: CPT | Mod: XS

## 2018-05-20 PROCEDURE — 85027 COMPLETE CBC AUTOMATED: CPT | Performed by: HOSPITALIST

## 2018-05-20 PROCEDURE — 92610 EVALUATE SWALLOWING FUNCTION: CPT | Mod: GN | Performed by: SPEECH-LANGUAGE PATHOLOGIST

## 2018-05-20 PROCEDURE — 40000225 ZZH STATISTIC SLP WARD VISIT: Performed by: SPEECH-LANGUAGE PATHOLOGIST

## 2018-05-20 PROCEDURE — A9270 NON-COVERED ITEM OR SERVICE: HCPCS | Mod: GY | Performed by: PSYCHIATRY & NEUROLOGY

## 2018-05-20 PROCEDURE — 97530 THERAPEUTIC ACTIVITIES: CPT | Mod: GP | Performed by: PHYSICAL THERAPIST

## 2018-05-20 PROCEDURE — 80076 HEPATIC FUNCTION PANEL: CPT | Performed by: HOSPITALIST

## 2018-05-20 PROCEDURE — 93010 ELECTROCARDIOGRAM REPORT: CPT | Performed by: INTERNAL MEDICINE

## 2018-05-20 PROCEDURE — 25000128 H RX IP 250 OP 636: Performed by: HOSPITALIST

## 2018-05-20 PROCEDURE — 80048 BASIC METABOLIC PNL TOTAL CA: CPT | Performed by: HOSPITALIST

## 2018-05-20 PROCEDURE — 25000125 ZZHC RX 250: Performed by: HOSPITALIST

## 2018-05-20 PROCEDURE — 97161 PT EVAL LOW COMPLEX 20 MIN: CPT | Mod: GP | Performed by: PHYSICAL THERAPIST

## 2018-05-20 PROCEDURE — 92526 ORAL FUNCTION THERAPY: CPT | Mod: GN | Performed by: SPEECH-LANGUAGE PATHOLOGIST

## 2018-05-20 PROCEDURE — 80061 LIPID PANEL: CPT | Performed by: HOSPITALIST

## 2018-05-20 PROCEDURE — 40000193 ZZH STATISTIC PT WARD VISIT: Performed by: PHYSICAL THERAPIST

## 2018-05-20 PROCEDURE — 40000133 ZZH STATISTIC OT WARD VISIT: Performed by: REHABILITATION PRACTITIONER

## 2018-05-20 PROCEDURE — 93880 EXTRACRANIAL BILAT STUDY: CPT

## 2018-05-20 PROCEDURE — A9585 GADOBUTROL INJECTION: HCPCS | Performed by: HOSPITALIST

## 2018-05-20 PROCEDURE — 84484 ASSAY OF TROPONIN QUANT: CPT | Performed by: HOSPITALIST

## 2018-05-20 PROCEDURE — 36415 COLL VENOUS BLD VENIPUNCTURE: CPT | Performed by: HOSPITALIST

## 2018-05-20 PROCEDURE — 97165 OT EVAL LOW COMPLEX 30 MIN: CPT | Mod: GO | Performed by: REHABILITATION PRACTITIONER

## 2018-05-20 RX ORDER — TAMSULOSIN HYDROCHLORIDE 0.4 MG/1
0.4 CAPSULE ORAL AT BEDTIME
Status: DISCONTINUED | OUTPATIENT
Start: 2018-05-20 | End: 2018-05-22 | Stop reason: HOSPADM

## 2018-05-20 RX ORDER — HYDROCODONE BITARTRATE AND ACETAMINOPHEN 5; 325 MG/1; MG/1
1 TABLET ORAL EVERY 6 HOURS PRN
Status: DISCONTINUED | OUTPATIENT
Start: 2018-05-20 | End: 2018-05-22 | Stop reason: HOSPADM

## 2018-05-20 RX ORDER — LACTULOSE 10 G/15ML
30 SOLUTION ORAL DAILY
Status: DISCONTINUED | OUTPATIENT
Start: 2018-05-20 | End: 2018-05-22 | Stop reason: HOSPADM

## 2018-05-20 RX ORDER — CARBIDOPA AND LEVODOPA 25; 100 MG/1; MG/1
1 TABLET, ORALLY DISINTEGRATING ORAL 3 TIMES DAILY
Status: DISCONTINUED | OUTPATIENT
Start: 2018-05-20 | End: 2018-05-20

## 2018-05-20 RX ORDER — ROSUVASTATIN CALCIUM 20 MG/1
40 TABLET, COATED ORAL DAILY
Status: DISCONTINUED | OUTPATIENT
Start: 2018-05-20 | End: 2018-05-22 | Stop reason: HOSPADM

## 2018-05-20 RX ORDER — OXYMETAZOLINE HYDROCHLORIDE 0.05 G/100ML
2 SPRAY NASAL 2 TIMES DAILY PRN
Status: DISCONTINUED | OUTPATIENT
Start: 2018-05-20 | End: 2018-05-22 | Stop reason: HOSPADM

## 2018-05-20 RX ORDER — CARBIDOPA AND LEVODOPA 25; 100 MG/1; MG/1
1.5 TABLET ORAL 3 TIMES DAILY
Status: DISCONTINUED | OUTPATIENT
Start: 2018-05-20 | End: 2018-05-20

## 2018-05-20 RX ORDER — CLOPIDOGREL BISULFATE 75 MG/1
75 TABLET ORAL DAILY
Status: DISCONTINUED | OUTPATIENT
Start: 2018-05-20 | End: 2018-05-22 | Stop reason: HOSPADM

## 2018-05-20 RX ORDER — ASPIRIN 81 MG/1
81 TABLET, CHEWABLE ORAL DAILY
Status: DISCONTINUED | OUTPATIENT
Start: 2018-05-21 | End: 2018-05-22 | Stop reason: HOSPADM

## 2018-05-20 RX ORDER — LORAZEPAM 2 MG/ML
.5-1 INJECTION INTRAMUSCULAR EVERY 4 HOURS PRN
Status: DISCONTINUED | OUTPATIENT
Start: 2018-05-20 | End: 2018-05-22 | Stop reason: HOSPADM

## 2018-05-20 RX ORDER — OXYMETAZOLINE HYDROCHLORIDE 0.05 G/100ML
2 SPRAY NASAL 2 TIMES DAILY
Status: DISCONTINUED | OUTPATIENT
Start: 2018-05-20 | End: 2018-05-20

## 2018-05-20 RX ORDER — ALLOPURINOL 300 MG/1
300 TABLET ORAL DAILY
Status: DISCONTINUED | OUTPATIENT
Start: 2018-05-20 | End: 2018-05-22 | Stop reason: HOSPADM

## 2018-05-20 RX ORDER — CARBIDOPA AND LEVODOPA 25; 100 MG/1; MG/1
1 TABLET ORAL 3 TIMES DAILY
Status: DISCONTINUED | OUTPATIENT
Start: 2018-05-20 | End: 2018-05-22 | Stop reason: HOSPADM

## 2018-05-20 RX ORDER — GADOBUTROL 604.72 MG/ML
8 INJECTION INTRAVENOUS ONCE
Status: COMPLETED | OUTPATIENT
Start: 2018-05-20 | End: 2018-05-20

## 2018-05-20 RX ADMIN — LACTULOSE 30 G: 20 SOLUTION ORAL at 12:03

## 2018-05-20 RX ADMIN — Medication 1 HALF-TAB: at 13:34

## 2018-05-20 RX ADMIN — ASPIRIN 300 MG: 300 SUPPOSITORY RECTAL at 02:13

## 2018-05-20 RX ADMIN — CARBIDOPA AND LEVODOPA 1 TABLET: 25; 100 TABLET ORAL at 16:48

## 2018-05-20 RX ADMIN — SODIUM CHLORIDE: 9 INJECTION, SOLUTION INTRAVENOUS at 01:58

## 2018-05-20 RX ADMIN — CLOPIDOGREL 75 MG: 75 TABLET, FILM COATED ORAL at 12:03

## 2018-05-20 RX ADMIN — ALLOPURINOL 300 MG: 300 TABLET ORAL at 12:03

## 2018-05-20 RX ADMIN — ASPIRIN 325 MG: 325 TABLET, DELAYED RELEASE ORAL at 09:15

## 2018-05-20 RX ADMIN — LORAZEPAM 1 MG: 2 INJECTION INTRAMUSCULAR; INTRAVENOUS at 14:14

## 2018-05-20 RX ADMIN — Medication 1 HALF-TAB: at 16:45

## 2018-05-20 RX ADMIN — Medication 10 MEQ: at 02:39

## 2018-05-20 RX ADMIN — TAMSULOSIN HYDROCHLORIDE 0.4 MG: 0.4 CAPSULE ORAL at 21:47

## 2018-05-20 RX ADMIN — GADOBUTROL 8 ML: 604.72 INJECTION INTRAVENOUS at 14:50

## 2018-05-20 RX ADMIN — ROSUVASTATIN CALCIUM 40 MG: 20 TABLET, FILM COATED ORAL at 16:02

## 2018-05-20 RX ADMIN — Medication 10 MEQ: at 04:50

## 2018-05-20 RX ADMIN — Medication 10 MEQ: at 03:42

## 2018-05-20 RX ADMIN — OMEPRAZOLE 40 MG: 20 CAPSULE, DELAYED RELEASE ORAL at 12:03

## 2018-05-20 RX ADMIN — CARBIDOPA AND LEVODOPA 1 TABLET: 25; 100 TABLET ORAL at 21:48

## 2018-05-20 RX ADMIN — OXYMETAZOLINE HYDROCHLORIDE 2 SPRAY: 5 SPRAY NASAL at 13:35

## 2018-05-20 RX ADMIN — CARBIDOPA AND LEVODOPA 1 TABLET: 25; 100 TABLET ORAL at 13:34

## 2018-05-20 RX ADMIN — SODIUM CHLORIDE: 9 INJECTION, SOLUTION INTRAVENOUS at 20:51

## 2018-05-20 RX ADMIN — Medication 1 HALF-TAB: at 21:49

## 2018-05-20 ASSESSMENT — ACTIVITIES OF DAILY LIVING (ADL)
ADLS_ACUITY_SCORE: 14
ADLS_ACUITY_SCORE: 18
ADLS_ACUITY_SCORE: 14
PREVIOUS_RESPONSIBILITIES: HOUSEKEEPING;LAUNDRY;SHOPPING;YARDWORK;MEDICATION MANAGEMENT;FINANCES;DRIVING

## 2018-05-20 ASSESSMENT — VISUAL ACUITY
OU: NORMAL ACUITY
OU: NORMAL ACUITY
OU: BASELINE
OU: NORMAL ACUITY
OU: NORMAL ACUITY

## 2018-05-20 NOTE — PLAN OF CARE
Problem: Patient Care Overview  Goal: Plan of Care/Patient Progress Review  Discharge Planner PT   Patient plan for discharge: not noted  Current status: CR: smoking cessation consult received; Per chart review, patient is a current smoker but admitted with encephalopathy and impaired cognition; Smoking cessation consult currently not appropriate. May benefit from a consult at a later day if/when cognition improves. Will complete consult  Barriers to return to prior living situation: See PT note  Recommendations for discharge: defer to PT  Rationale for recommendations: defer to PT       Entered by: Anne Salgado 05/20/2018 10:53 AM

## 2018-05-20 NOTE — PLAN OF CARE
Problem: Patient Care Overview  Goal: Plan of Care/Patient Progress Review  PT: Orders received, chart reviewed, eval and treat initiated. Pt was admitted with possible CVA with R side weakness. Pt lives in [place] with wife in a house with 3 stairs to enter from the front door or 8 steps up from garage with B railings. Wife is fairly IND but has some mobility problems, FWW and SEC at home for her. Pt is IND with mobility at baseline and drives. Does not have family in [place] that are around to assist.    Discharge Planner PT   Patient plan for discharge: Home with spouse  Current status: Pt completes supine<>sit with SBA-IND. Sit<>stand SBA. Amb 100' with FWW and SBA or 100' with no AD and CGA, several balance deficits noted. 6 stairs with B railings and CGA, pt demonstrating several small LOB with stairs and turning at top. Pt very impulsive, tending to stand up even when cued to stay seated. Pt seems unaware of balance deficits; states he has tremors at baseline from Parkinson's. Nurse does state pt is currently on med that will cause some balance issues.  Barriers to return to prior living situation: Falls risk with balance deficits, deficits with safety awareness  Recommendations for discharge: TCU, pending ability to improve stability could return home with wife, OPPT   Rationale for recommendations: Pt was previously IND with mobility. Current mobility puts pt at falls risk and pt does not have anyone who can assist at home as wife has somewhat impaired mobility. Pt would currently benefit from TCU to address deficits; however may progress enough to return to home with wife and OPPT.       Entered by: Verito Brewer 05/20/2018 3:51 PM

## 2018-05-20 NOTE — PROGRESS NOTES
05/20/18 0928   General Information   Onset Date 05/19/18   Start of Care Date 05/20/18   Referring Physician Dr. Ruiz   Patient Profile Review/OT: Additional Occupational Profile Info See Profile for full history and prior level of function   Patient/Family Goals Statement Patient did not state.   Swallowing Evaluation Bedside swallow evaluation   Behaviorial Observations Alert;Confused;Distractible;Impulsive   Mode of current nutrition Oral diet   Type of oral diet Regular;Thin liquid   Respiratory Status Room air   Comments Brent Vega is a 73 year old male with past history CVA, CAD, HTN, hyperlipidemia, Parkinsons, possible cognitive impairment, hx of lymphoma among other chronic medical conditions who transfers from [facility] with encephalopathy and possible CVA. MRI is pending.   Clinical Swallow Evaluation   Oral Musculature anomalies present   Structural Abnormalities none present   Dentition upper and lower dentures  (Missing his lower denture.)   Mucosal Quality dry   Mandibular Strength and Mobility intact   Oral Labial Strength and Mobility impaired retraction;impaired pursing   Lingual Strength and Mobility impaired protrusion;impaired anterior elevation;impaired left lateral movement;impaired right lateral movement;impaired coordination   Velar Elevation intact   Buccal Strength and Mobility intact   Laryngeal Function Cough;Throat clear;Swallow;Voicing initiated;Dry swallow palpated   Oral Musculature Comments Incoordination   Additional Documentation Yes   Swallow Eval   Feeding Assistance set up only required   Clinical Swallow Eval: Thin Liquid Texture Trial   Mode of Presentation, Thin Liquids cup;self-fed   Volume of Liquid or Food Presented 8 oz of water   Oral Phase of Swallow Premature pharyngeal entry   Pharyngeal Phase of Swallow impaired;repeated swallows;throat clearing   Diagnostic Statement Premature spillage with intermittent throat clearing on consecutive swallows.     Clinical Swallow Eval: Puree Solid Texture Trial   Mode of Presentation, Puree spoon;fed by clinician   Volume of Puree Presented 4 oz of pudding   Oral Phase, Puree Poor AP movement;Residue in oral cavity;Premature pharyngeal entry   Oral Residue, Puree soft palate   Pharyngeal Phase, Puree impaired;repeated swallows;reduction in laryngeal movement   Diagnostic Statement Incoordination of the bolus.    Clinical Swallow Eval: Semisolid Texture Trial   Mode of Presentation, Semisolid self-fed   Volume of Semisolid Food Presented 2 bites   Oral Phase, Semisolid Poor AP movement;Residue in oral cavity;Premature pharyngeal entry   Oral Residue, Semisolid left anterior lateral sulci;mid posterior tongue   Pharyngeal Phase, Semisolid impaired;reduction in laryngeal movement;repeated swallows   Diagnostic Statement Poor bolus coordination.   Clinical Swallow Eval: Solid Food Texture Trial   Mode of Presentation, Solid self-fed   Volume of Solid Food Presented 1 luisa cracker   Oral Phase, Solid Poor AP movement;Residue in oral cavity;Premature pharyngeal entry   Oral Residue, Solid mid posterior tongue;left anterior lateral sulci   Pharyngeal Phase, Solid impaired;reduction in laryngeal movement;repeated swallows;throat clearing   Diagnostic Statement Poor bolus coordination and AP movement.    Swallow Compensations   Swallow Compensations No compensations were used   Results Suspect silent aspiration;Oral difficulties only   General Therapy Interventions   Planned Therapy Interventions Dysphagia Treatment   Dysphagia treatment Modified diet education;Instruction of safe swallow strategies   Swallow Eval: Clinical Impressions   Skilled Criteria for Therapy Intervention Skilled criteria met.  Treatment indicated.   Functional Assessment Scale (FAS) 4   Treatment Diagnosis Mild moderate oral and pharyngeal dysphagia   Diet texture recommendations Dysphagia diet level 3;Thin liquids   Recommended Feeding/Eating Techniques  alternate between small bites and sips of food/liquid;check mouth frequently for oral residue/pocketing;maintain upright posture during/after eating for 30 mins;no straws;small sips/bites   Therapy Frequency daily   Predicted Duration of Therapy Intervention (days/wks) 5 days   Anticipated Discharge Disposition (To be determined. )   Risks and Benefits of Treatment have been explained. Yes   Patient, family and/or staff in agreement with Plan of Care Yes   Clinical Impression Comments Patient presents with mild to moderate oral and pharyngeal dysphagia at bedside. Patient mild oral motor deficits with ROM/coordination. He demonstrated premature entry of thin liquids via the cup without overt Sx of aspiration. Lingual incoordination and tongue thushing forward with pudding, semi-solids and solids. Mild to moderate oral residue on mid tongue and left lateral sulci. Minimal throat clearing noted after the swallow and suspect some retention. He was missing his lower denture at the time of the evaluation prolonging mastication. Impulsive behaviors and confusion is increasing his risk for aspiration. Recommend: 1. Dysphagia Diet level 3 with thin liquids. 2. Upright or in a chair, no straws, slow rate, check for oral residue and double swallow.    Total Evaluation Time   Total Evaluation Time (Minutes) 15

## 2018-05-20 NOTE — PROVIDER NOTIFICATION
Brief update:    Paged re: positive troponin  (0.4 range)    Here w/ R weakness, possible L thalamic CVA on CT at OSH.    EKG w/ RBBB.  Note outside records with nl stress TTE 4/2017, hx of proximal LAD and distal RCA stenting in 2015    Troponin trend pending, TTE pending for AM  Rectal ASA    Remains NPO, will not initiate heparin ggt at this time.    W/ CT findings, primary concern for troponin elevation related to CVA.    Angel Lancaster MD  2:19 AM

## 2018-05-20 NOTE — CONSULTS
[facility]  Vascular Neurology Consult Note  05/20/2018    Patient Name: Brent Vega  Admission Date: 5/19/2018  Date of Service: 05/20/2018  Current hospital day: Hospital Day: 2  Reason for Consult : Encephalopathy  Chief Complaint : Encephalopathy  Primary Care Physician:  ROGELIO GO  Code Status  Full Code    HPI:  MR Brent Vega is a pleasant 74 y/o M with PMH of CAD(on ASA, PLavix and Statins), Parkinson's Disease, oral tics, memory impairment with suspicion of primary dementia, Malignant Non Hodgikin Lymphoma 2012 s/p treatment, Depression, Shoulder pain with steroids injection every 3 month, who was admitted for encephalopathy. His Nephew noticed increasaed clumsiness over the course of the day of 5/19 to the extent he became disoriented, he also has fever with marked increase of tremors with some dysartheria and difficulty in finding words that he needs to seek medical advise. When he cam to the Ed of [facility] he was having Fever CT head was done showing tiny LT frontal hyperdense area so the plan was to transfer to [facility] to get an MRI of the brain.    When I saw the patient he was having Tremors in RT UL, RT LL in addition to minimal choreic movements, with oral tics.    Off Note:  Patient has Non Hodgkin Lmphoma in 2012 S/P treatment and during that time he noticed tremor mainly at the RT hand  Mainly at rest and also with violational acticvity as writing which is affecting his daily activity in addition to Vocal Tics so he was started on ( Levododpa/carbidopa which was unhelpful at the beginning but when the patient decreased his doses the tremors and the Tics worsened ). In addition to that he has memory problems with suspicion of Primary dementia was receiving Depakote for it which got tappered down with plan to use donapezil if worsened memroy.    Problem List:  Stroke  CAD  HTN  HLD  Parkinson's Diseas  Non Hodgikins Lymphoma    Co  morbidities:  Memory Problem  Bipolar Disorder/ Depression  Hearing Loss    Assessment:  Brent Vega is a 73 year old male with PMH of CVA, CAD, PArkinson's, Non Hodgkin Lymphoma, memory problem who presents with Confusion with more tremors, fever and dysartheria  Found to have tiny LT Parietal  cortical tiny hyperdense area etiology likely unclear. TPA was not given for outside window and stroke mimics with a plan to get an MRI of the Brain    Important Relevant Tests:    A1C 5.6  Echocardiogram Pending  Troponin 0.4  CT Head LT parietal Cortical tiny Hyper density   MRI Head Pending  MRA head and Neck: Pending    Recommendation:  Q 4 hr Neurocheck   SBP < 140  MRI brain pending  Crestor 40 mg  Aspirin 81 mg Daily  Plavix 75 mg daily  Resume his home dose of Levodopa/Carbidopa (ordered)  Good Hydration  Infectious work up  F/U with Neurologist in 6-8 weeks  Rest of medical management is per the primary team   PT/OT/SLP  Will sign off if the MRI came back normal    Other Medical Problems:    Activity: As tolerated  Diet: Per Speech therapy recs  DVT ppx: SCDs   Disposition: Per PT/OT Recs    Plan discussed with Dr. Best    Thanks for involving the Stroke team in the care of the patient, please if you have any questions feel free to contact the Stroke team.     The patient, The primary team & the nursing staff are updated with the plan.    Debra Vazquez   Neurocritical care fellow  P: [phone]      Past Medical History    I have reviewed this patient's medical history and updated it with pertinent information if needed.     Hypertension     Parkinson's Disease     Stroke (HCC)     CAD (coronary artery disease) 2/23/2015     Heart attack (HCC)     Hyperlipidemia     Bipolar disorder (HCC)     Memory loss     Depression     Cancer (HCC)     Hearing loss     Past Surgical History   I have reviewed this patient's surgical history and updated it with pertinent information if needed.    Appendectomy      Coronary stent placement     Eye surgery     Hernia repair       Prior to Admission Medications   Prior to Admission Medications   Prescriptions Last Dose Informant Patient Reported? Taking?   ALLOPURINOL PO   Yes No   Sig: Take 300 mg by mouth daily   ALPRAZolam (XANAX PO)   Yes No   Sig: Take 0.25 mg by mouth 3 times daily as needed for anxiety   Acetaminophen (TYLENOL PO)   Yes No   Sig: Take 325 mg by mouth   Atorvastatin Calcium (LIPITOR PO)   Yes No   Sig: Take 80 mg by mouth daily   Clopidogrel Bisulfate (PLAVIX PO)   Yes No   Sig: Take 75 mg by mouth   Divalproex Sodium (DEPAKOTE PO)   Yes No   Sig: Take 500 mg by mouth daily   HYDROcodone-acetaminophen (NORCO) 5-325 MG per tablet   Yes No   Sig: Take 1 tablet by mouth every 6 hours as needed for severe pain (1/2-1tab bid prn)   LOSARTAN POTASSIUM PO   Yes No   Sig: Take 25 mg by mouth daily   Lansoprazole (PREVACID PO)   Yes No   Sig: Take 30 mg by mouth   MAGNESIUM OXIDE PO   Yes No   Sig: Take 100 mg by mouth daily   Metoprolol Succinate (TOPROL XL PO)   Yes No   Sig: Take 100 mg by mouth daily   Morphine Sulfate (MS CONTIN PO)   Yes No   Sig: Take 15 mg by mouth every 12 hours   Ondansetron (ZOFRAN ODT PO)   Yes No   Sig: Take 4 mg by mouth   aspirin 325 MG tablet   Yes No   Sig: Take 325 mg by mouth daily   carbidopa-levodopa (SINEMET)  MG per tablet   Yes No   Sig: Take 1 tablet by mouth 3 times daily   diphenoxylate-atropine (LOMOTIL) 2.5-0.025 MG per tablet   Yes No   Sig: Take 1 tablet by mouth 4 times daily as needed for diarrhea   hydrochlorothiazide (MICROZIDE) 12.5 MG capsule   Yes No   Sig: Take 25 mg by mouth daily   lactulose (CHRONULAC) 10 GM/15ML solution   Yes No   Sig: Take 30 g by mouth daily   nicotine (NICODERM CQ) 21 MG/24HR 24 hr patch   Yes No   Sig: Place 1 patch onto the skin every 24 hours   oxybutynin (DITROPAN-XL) 10 MG 24 hr tablet   Yes No   Sig: Take 10 mg by mouth daily   sucralfate (CARAFATE) 1 GM tablet   Yes No    Sig: Take 1 g by mouth 4 times daily      Facility-Administered Medications: None       Medications     - MEDICATION INSTRUCTIONS -       sodium chloride 100 mL/hr at 05/20/18 0158       aspirin  325 mg Oral Daily    Or     aspirin  300 mg Rectal Daily     atorvastatin  80 mg Oral or NG Tube Daily at 8 pm     sodium chloride (PF)  3 mL Intracatheter Q8H       Allergies   Allergies   Allergen Reactions     Chantix [Varenicline] Unknown     Gabapentin Unknown       Social History   I have reviewed this patient's social history and updated it with pertinent information if needed. Brent ALICEA Silvia  reports that he has been smoking.  He has been smoking about 3.00 packs per day. He has never used smokeless tobacco.    Family History   I have reviewed this patient's family history and updated it with pertinent information if needed.   Father, Older Brother Younger Brother has history of Parkinson's Disease    Review of Systems   The 10 point Review of Systems is negative other than noted in the HPI or here.       Physical Exam   Objective:  Temp:  [98.5  F (36.9  C)-101.7  F (38.7  C)] 98.5  F (36.9  C)  Pulse:  [94] 94  Heart Rate:  [70-99] 80  Resp:  [12-82] 16  BP: (101-162)/() 148/77  SpO2:  [77 %-100 %] 100 %  No Data Recorded    GENERAL: NAD, lying on bed  HEENT: NC/AT. Mucous membranes moist.  CARDIAC: RRR without  Murmur.  RESPIRATORY: Good effort. CTAB. No w/r/r appreciated.  ABDOMINAL: Soft, non tender, non distended. + BS.   EXTREMITIES: Warm, dry.     NEUROLOGIC:  MS: PT is alert concious, Oriented to time , place & person.  CN:    II - visual fields intact  III, IV, VI - EOMI, PERRL, no nystagmus noted  V - facial sensation intact and equal   VII - facial movement symmetrical  VIII - hearing intact to conversation  IX, X - uvula midline  XII - tongue midline with full ROM  MOTOR: normal tone throughout, Tremors in RT UL and LL with minimal choreic movements with oral TICs, strength 5/5 in both UL  and LL  SENSORY: intact and symmetric to light touch throughout upper and lower extremities  REFLEXES: 2+ and symmetric in patellar, biceps, and brachioradialis  COORDINATION: intact  GAIT:  Deferred    National Institutes of Health Stroke Scale   Score    Level of consciousness: (0)   Alert, keenly responsive    LOC questions: (0)   Answers both questions correctly    LOC commands: (0)   Performs both tasks correctly    Best gaze: (0)   Normal    Visual: (0)   No visual loss    Facial palsy: (0)   Normal symmetrical movements    Motor arm (left): (0)   No drift    Motor arm (right): (0)   No drift    Motor leg (left): (0)   No drift    Motor leg (right): (0)   No drift    Limb ataxia: (0)   Absent    Sensory: (0)   Normal- no sensory loss    Best language: (0)   Normal- no aphasia    Dysarthria: (0)   Normal    Extinction and inattention: (0)   No abnormality        Total Score:  0       Data       Labs:  CBC  Recent Labs  Lab 05/19/18  1532   WBC 8.4   HGB 11.5*          COAGSNo results for input(s): INR, PTT, AXA, FIBR in the last 168 hours.    INR:No lab results found.     BMP  Recent Labs  Lab 05/20/18  0100 05/19/18  1532   NA  --  138   POTASSIUM 3.1* 3.3*   CHLORIDE  --  105   CO2  --  23   BUN  --  16   CR  --  1.03   KENNEY  --  9.8       Liver panel:  Recent Labs   Lab Test  05/20/18   0100   PROTTOTAL  6.6*   ALBUMIN  3.1*   BILITOTAL  0.7   ALKPHOS  95   AST  17   ALT  12       ABGNo results for input(s): PH, PCO2, PO2, HCO3 in the last 168 hours.    CRP/ESRNo results for input(s): CRP in the last 168 hours.    Invalid input(s): ESR    CSFNo results for input(s): CGLU, CTP in the last 168 hours.    Invalid input(s): CCSF    MICRONo results for input(s): CULT in the last 168 hours.    LIPID Profile: No results found for: CHOL  No results found for: HDL  No results found for: LDL  No results found for: TRIG  No results found for: CHOLHDLRATIO    A1C:   Recent Labs   Lab Test  05/20/18   0100   A1C   5.6       Troponin I:   Recent Labs   Lab Test  05/20/18   0100   TROPI  0.463*       Imaging  Recent Results (from the past 24 hour(s))   XR Chest 2 Views    Narrative    PROCEDURE:  XR CHEST 2 VW    HISTORY:  fever; .     COMPARISON:  None.    FINDINGS:   The cardiac silhouette is normal in size. The pulmonary vasculature is  normal.  The lungs are clear. No pleural effusion or pneumothorax. Is  an infusion port with its tip in superior vena cava.      Impression    IMPRESSION:  No acute cardiopulmonary disease.      GWENDOLYN BARTON MD   CT Head w/o Contrast    Narrative    PROCEDURE: CT HEAD W/O CONTRAST     HISTORY: change in neuro status with fever and increased tremors; .    COMPARISON: None.    TECHNIQUE:  Helical images of the head from the foramen magnum to the  vertex were obtained without contrast.    FINDINGS: The ventricles and sulci are normal in volume. There is a  small area of increased density seen on axial image #22 medially in  the left parietal lobe. There is no associated mass effect. Further  evaluation with a small area with MR scanning is recommended. It  measures approximately 8 mm in maximal diameter. There is abnormal  low-density seen in the left parietal lobe on axial image #15 and the  possibility of an evolving left thalamic infarct exists. There is a  small lacunar infarcts seen in the external capsule on the right. The  brainstem and cerebellum appear normal.    The grey-white matter interface is preserved.    The calvarium is intact. The mastoid air cells are clear.  The  visualized paranasal sinuses are clear.      Impression    IMPRESSION:   1. Small area of high density seen in the left parietal cortex  medially may represent a small vascular malformation I would recommend  however that the MR scan be obtained to further evaluate this area.  2. Low-density area in the  left thalmus. The possibility of an  evolving left thalamic infarct exists. MR scanning would confirm or  rule  this out      GWENDOLYN BARTON MD

## 2018-05-20 NOTE — PROGRESS NOTES
[facility]  Hospitalist Progress Note        Tony Gupta, DO  05/20/2018        Interval History:      Patient able to answer orientation questions today, discussed plan of care, verbalized understanding.          Assessment and Plan:        Brent Vega is a 73 year old male with past history CVA, CAD, HTN, hyperlipidemia, Parkinsons, possible cognitive impairment, hx of lymphoma among other chronic medical conditions who transfers from [facility] with encephalopathy and possible CVA.       Acute ecephalopathy: Highest on differential is stroke.  Unclear how much of his confusion at arrival is related to 3mg lorazepam he received for agitation at outside ED.  Had fever at outside ED, so infectious source is also a strong consideration.  Note his outpatient Neurologist has documented low SLUMS score.  - management of various issues as below  - MRI head  - Neurology following.      Possible CVA:  Head CT with area of low density in left thalamus which may be stroke.  Also noted area of high density in left parietal region, recommended MRI for further evaluation.  - PT/OT eval  - MRI/MRA  - Echo.   - daily aspirin and statin  - PTA Plavix   - Neuro consulted     Hypokalemia:  K 3.3 at outside ED.  - replace per protocol     Fever:  Febrile to 101.7 at outside ED.  CXR and UA unremarkable, blood cultures were not drawn.  No leukocytosis.  No infectious complaints (though limited reliability of ROS) and no findings on exam to suggest infectious source.  No meningeal signs on exam.  - Monitor.   - follow fever and CBC; blood cultures if spikes again  - if recurrent fever, may need to consider LP     Hx of CVA:  Occurred about 2007.  No residual deficits per wife.  - Per Neurology.      CAD s/p PCI x2:  Details of placement unknown.  Stress test 4/2017 normal with EF 60-65%.  - Permissive hypertension at this time, defer to Neurology.      Parkinson's:  Resume PTA Sinemet and Cogentin  once verified.     Non-hodgkins lymphoma, in remission:  Last chemotherapy 2 years ago per wife.  No known recurrence.  - Monitor.      Chronic abdominal pain: Stable.   - Hold PTA MS Contin   - Resumed PTA Norco     BPH:  Flomax and hold oxybutinin.     Depression and anxiety:  Hold mirtazepine, prn ativan.      GERD: PPI      Gout:  allopurinol      Tobacco dependence:  Encouraged cessation.     DVT Prophylaxis: Pneumatic Compression Devices    Code Status: Full Code      Disposition: Expected discharge once work-up complete.    Pain: # Pain Assessment:  Current Pain Score 2018   Patient currently in pain? yes   Pain score (0-10) 4   - Brent is experiencing pain. Pain management was discussed and the plan was created in a collaborative fashion.  Brent's response to the current recommendations: mixed response  - Please see the plan for pain management as documented above                   Physical Exam:      Heart Rate: 80    Blood pressure 148/77, temperature 98.5  F (36.9  C), temperature source Oral, resp. rate 16, weight 77.4 kg (170 lb 10.2 oz), SpO2 100 %.    Vitals:    18 0300   Weight: 77.4 kg (170 lb 10.2 oz)       Vital Sign Ranges  Temperature Temp  Av.9  F (37.7  C)  Min: 98.5  F (36.9  C)  Max: 101.7  F (38.7  C)   Blood pressure Systolic (24hrs), Av , Min:101 , Max:162        Diastolic (24hrs), Av, Min:60, Max:116      Pulse Pulse  Av  Min: 94  Max: 94   Respirations Resp  Av.5  Min: 12  Max: 82   Pulse oximetry SpO2  Av.4 %  Min: 77 %  Max: 100 %     Vital Signs with Ranges  Temp:  [98.5  F (36.9  C)-101.7  F (38.7  C)] 98.5  F (36.9  C)  Pulse:  [94] 94  Heart Rate:  [70-99] 80  Resp:  [12-82] 16  BP: (101-162)/() 148/77  SpO2:  [77 %-100 %] 100 %    I/O Last 3 Shifts:        I/O past 24 hours:     Intake/Output Summary (Last 24 hours) at 18 0811  Last data filed at 18 0600   Gross per 24 hour   Intake                0 ml   Output                 0 ml   Net                0 ml     GENERAL: Alert and oriented. NAD. Conversational, appropriate. Tremulous.   HEENT: Normocephalic. EOMI. No icterus or injection. Nares normal.   LUNGS: Clear to auscultation. No dyspnea at rest.   HEART: Regular rate. Extremities perfused. port in left upper chest  ABDOMEN: Soft, nontender, and nondistended. Positive bowel sounds.   EXTREMITIES: No LE edema noted.   NEUROLOGIC: Moves extremities x4. Follows commands. Tremulous.          Prior to Admission Medications:        Prescriptions Prior to Admission   Medication Sig Dispense Refill Last Dose     Acetaminophen (TYLENOL PO) Take 325 mg by mouth        ALLOPURINOL PO Take 300 mg by mouth daily        ALPRAZolam (XANAX PO) Take 0.25 mg by mouth 3 times daily as needed for anxiety        aspirin 325 MG tablet Take 325 mg by mouth daily        Atorvastatin Calcium (LIPITOR PO) Take 80 mg by mouth daily        carbidopa-levodopa (SINEMET)  MG per tablet Take 1 tablet by mouth 3 times daily        Clopidogrel Bisulfate (PLAVIX PO) Take 75 mg by mouth        diphenoxylate-atropine (LOMOTIL) 2.5-0.025 MG per tablet Take 1 tablet by mouth 4 times daily as needed for diarrhea        Divalproex Sodium (DEPAKOTE PO) Take 500 mg by mouth daily        hydrochlorothiazide (MICROZIDE) 12.5 MG capsule Take 25 mg by mouth daily        HYDROcodone-acetaminophen (NORCO) 5-325 MG per tablet Take 1 tablet by mouth every 6 hours as needed for severe pain (1/2-1tab bid prn)        lactulose (CHRONULAC) 10 GM/15ML solution Take 30 g by mouth daily        Lansoprazole (PREVACID PO) Take 30 mg by mouth   5/19/2018 at Unknown time     LOSARTAN POTASSIUM PO Take 25 mg by mouth daily        MAGNESIUM OXIDE PO Take 100 mg by mouth daily        Metoprolol Succinate (TOPROL XL PO) Take 100 mg by mouth daily        Morphine Sulfate (MS CONTIN PO) Take 15 mg by mouth every 12 hours        nicotine (NICODERM CQ) 21 MG/24HR 24 hr patch Place 1 patch  onto the skin every 24 hours        Ondansetron (ZOFRAN ODT PO) Take 4 mg by mouth        oxybutynin (DITROPAN-XL) 10 MG 24 hr tablet Take 10 mg by mouth daily        sucralfate (CARAFATE) 1 GM tablet Take 1 g by mouth 4 times daily               Medications:        Current Facility-Administered Medications   Medication Last Rate     - MEDICATION INSTRUCTIONS -       sodium chloride 100 mL/hr at 05/20/18 0158     Current Facility-Administered Medications   Medication Dose Route Frequency     aspirin  325 mg Oral Daily    Or     aspirin  300 mg Rectal Daily     atorvastatin  80 mg Oral or NG Tube Daily at 8 pm     sodium chloride (PF)  3 mL Intracatheter Q8H     Current Facility-Administered Medications   Medication Dose Route Frequency     acetaminophen  650 mg Rectal Q4H PRN     acetaminophen  650 mg Oral Q4H PRN     bisacodyl  10 mg Rectal Daily PRN     hydrALAZINE  10-20 mg Intravenous Q1H PRN     labetalol  10-40 mg Intravenous Q10 Min PRN     lidocaine 4%   Topical Q1H PRN     lidocaine (buffered or not buffered)  1 mL Other Q1H PRN     - MEDICATION INSTRUCTIONS -   Does not apply Continuous PRN     melatonin  1 mg Oral At Bedtime PRN     naloxone  0.1-0.4 mg Intravenous Q2 Min PRN     ondansetron  4 mg Oral Q6H PRN    Or     ondansetron  4 mg Intravenous Q6H PRN     polyethylene glycol  17 g Oral Daily PRN     potassium chloride  20-40 mEq Oral or Feeding Tube Q2H PRN     potassium chloride with lidocaine  10 mEq Intravenous Q1H PRN     potassium chloride  10 mEq Intravenous Q1H PRN     potassium chloride  20 mEq Intravenous Q1H PRN     potassium chloride  20-40 mEq Oral Q2H PRN     prochlorperazine  5 mg Intravenous Q6H PRN    Or     prochlorperazine  5 mg Oral Q6H PRN    Or     prochlorperazine  12.5 mg Rectal Q12H PRN     senna-docusate  1 tablet Oral BID PRN    Or     senna-docusate  2 tablet Oral BID PRN     sodium chloride (PF)  3 mL Intracatheter Q1H PRN            Data:      Lab data reviewed.      Recent Labs  Lab 05/20/18  0100 05/19/18  1532   HGB  --  11.5*   MCV  --  82   PLT  --  265   NA  --  138   POTASSIUM 3.1* 3.3*   CHLORIDE  --  105   CO2  --  23   BUN  --  16   CR  --  1.03   ANIONGAP  --  10   KENNEY  --  9.8   GLC 88 97   TROPI 0.463*  --            Imaging:      Imaging data reviewed.     DASHA BroussardO.  [facility]idgeview Medical Centerist  Pager [phone]

## 2018-05-20 NOTE — PROGRESS NOTES
05/20/18 1533   Quick Adds   Type of Visit Initial PT Evaluation   Living Environment   Lives With spouse   Living Arrangements house   Home Accessibility stairs to enter home   Number of Stairs to Enter Home 8   Number of Stairs Within Home 0   Stair Railings at Home inside, present at both sides   Transportation Available car   Living Environment Comment Pt lives in house in [place] with wife who is fairly IND but has some decreased mobility. Either 3 stairs to enter through front or 8 steps up from garage. Pt drives. Does not have much family in the area that can help out.   Self-Care   Usual Activity Tolerance good   Current Activity Tolerance moderate   Regular Exercise no   Equipment Currently Used at Home none   Activity/Exercise/Self-Care Comment Pt states he does not use any AD at baseline although his wife has a SEC/walker. Usually is IND with mobility   Functional Level Prior   Ambulation 0-->independent   Transferring 0-->independent   Toileting 0-->independent   Bathing 0-->independent   Dressing 0-->independent   Eating 0-->independent   Communication 0-->understands/communicates without difficulty   Swallowing 0-->swallows foods/liquids without difficulty   Cognition 0 - no cognition issues reported   Fall history within last six months no   Which of the above functional risks had a recent onset or change? ambulation;transferring   Prior Functional Level Comment IND at baseline   General Information   Onset of Illness/Injury or Date of Surgery - Date 05/19/18   Referring Physician Wili Ruiz MD   Patient/Family Goals Statement Return to home with wife   Pertinent History of Current Problem (include personal factors and/or comorbidities that impact the POC) Pt was admitted with possible CVA with R side weakness/encephalopathy. PMH of  CVA, CAD, HTN, hyperlipidemia, Parkinsons, possible cognitive impairment, hx of lymphoma .   Precautions/Limitations fall precautions   Weight-Bearing Status - LUE full  weight-bearing   Weight-Bearing Status - RUE full weight-bearing   Weight-Bearing Status - LLE full weight-bearing   Weight-Bearing Status - RLE full weight-bearing   General Observations Pt has resting tremors that are greater on R, pt noting this is normal at baseline d/t Parkinson's   General Info Comments Nurse notes that pt has received a med that may be affecting his balance today/   Cognitive Status Examination   Orientation orientation to person, place and time   Level of Consciousness alert   Follows Commands and Answers Questions 100% of the time   Personal Safety and Judgment impaired;impulsive   Memory intact   Cognitive Comment Pt is somewhat impulsive and states he does not need to use an AD at home even though his Parkinson's often slows him up a lot. Stands up several times on his own even when cued to wait.   Pain Assessment   Patient Currently in Pain No   Integumentary/Edema   Integumentary/Edema no deficits were identifed   Posture    Posture Forward head position;Protracted shoulders   Range of Motion (ROM)   ROM Comment WFL   Strength   Strength Comments General 5/5 strength   Bed Mobility   Bed Mobility Comments Pt completes bed mobility SBA.   Transfer Skills   Transfer Comments Pt completes sit<>stand with SBA easily.   Gait   Gait Comments Pt amb with FWW using SBA and with no AD using CGA, several balance deficits noted with this including bumping into wall or objects in [place].   Balance   Balance Comments Pt demonstrates fairly good static standing balance and fair to poor dynamic balance   General Therapy Interventions   Planned Therapy Interventions balance training;bed mobility training;gait training;ROM;strengthening;stretching;transfer training;home program guidelines;progressive activity/exercise   Clinical Impression   Criteria for Skilled Therapeutic Intervention yes, treatment indicated   PT Diagnosis Decreased mobility following possible L CVA   Influenced by the following  "impairments Balance deficits, impulsivity, tremors   Functional limitations due to impairments Dependence with mobility/transfers, falls risk   Clinical Presentation Stable/Uncomplicated   Clinical Presentation Rationale Pt is medically stable   Clinical Decision Making (Complexity) Low complexity   Therapy Frequency` 2 times/day   Predicted Duration of Therapy Intervention (days/wks) 5 days   Anticipated Equipment Needs at Discharge standard cane;front wheeled walker   Anticipated Discharge Disposition Transitional Care Facility;Home with Outpatient Therapy   Risk & Benefits of therapy have been explained Yes   Patient, Family & other staff in agreement with plan of care Yes   [facility] AM-PAC  \"6 Clicks\" V.2 Basic Mobility Inpatient Short Form   1. Turning from your back to your side while in a flat bed without using bedrails? 4 - None   2. Moving from lying on your back to sitting on the side of a flat bed without using bedrails? 4 - None   3. Moving to and from a bed to a chair (including a wheelchair)? 3 - A Little   4. Standing up from a chair using your arms (e.g., wheelchair, or bedside chair)? 3 - A Little   5. To walk in hospital room? 3 - A Little   6. Climbing 3-5 steps with a railing? 3 - A Little   Basic Mobility Raw Score (Score out of 24.Lower scores equate to lower levels of function) 20   Total Evaluation Time   Total Evaluation Time (Minutes) 10     "

## 2018-05-20 NOTE — PLAN OF CARE
Problem: Patient Care Overview  Goal: Plan of Care/Patient Progress Review  OT eval completed and one treatment session provided. Previous level of function: Patient lives [place] in a house in [place] with his wife. He typically uses no AE but has used a can on occasion due to his PD. Pt does yardwork, housework, manages his finances, drives at baseline.      Discharge Planner OT   Patient plan for discharge: Home  Current status: Pt moving with SBA, no overt LOB. Able to dress self INavarroly, teach back safe shower strategies. Anticipate pt may have some higher level balance issues, PT to assess.  Barriers to return to prior living situation: Stairs, distance from home (pt drove here and will need to return home)  Recommendations for discharge: Home with A of wife as needed  Rationale for recommendations: Skilled occupational therapy intervention was warranted for one session to provide skilled education and return pt to PLOF and previous living situation. No further OT needs identified, DC OT.       Entered by: Hanane Earl 05/20/2018 12:26 PM     Occupational Therapy Discharge Summary    Reason for therapy discharge:    All goals and outcomes met, no further needs identified.    Progress towards therapy goal(s). See goals on Care Plan in [facility] electronic health record for goal details.  Goals met    Therapy recommendation(s):    No further therapy is recommended.

## 2018-05-20 NOTE — H&P
[facility]    History and Physical  Hospitalist       Date of Admission:  5/19/2018    Assessment & Plan   Brent Vega is a 73 year old male with past history CVA, CAD, HTN, hyperlipidemia, Parkinsons, possible cognitive impairment, hx of lymphoma among other chronic medical conditions who transfers from [facility] with encephalopathy and possible CVA.        Acute ecephalopathy: Highest on differential is stroke.  Unclear how much of his confusion at arrival is related to 3mg lorazepam he received for agitation at outside ED.  Had fever at outside ED, so infectious source is also a strong consideration.  Note his outpatient Neurologist has documented low SLUMS score.  - management of various issues as below    Possible CVA:  Head CT with area of low density in left thalamus which may be stroke.  Also noted area of high density in left parietal region, recommended MRI for further evaluation.  - NPO pending SLP eval  - PT/OT eval  - MRI/MRA, telemetry, TTE, A1C, serial trop, lipid panel  - daily aspirin and statin, resume PTA Plavix once verified  - Neuro consult    Hypokalemia:  K 3.3 at outside ED.  - replace per protocol, resume PTA supplement once verified    Fever:  Febrile to 101.7 at outside ED.  CXR and UA unremarkable, blood cultures were not drawn.  No leukocytosis.  No infectious complaints (though limited reliability of ROS) and no findings on exam to suggest infectious source.  No meningeal signs on exam.  - check LFT's, procalcitonin  - follow fever and CBC; blood cultures if spikes again  - if recurrent fever, may need to consider LP    Hx of CVA:  Occurred about 2007.  No residual deficits per wife.  - aspirin and Plavix as above    CAD s/p PCI x2:  Details of placement unknown.  Stress test 4/2017 normal with EF 60-65%.  - continue aspirin and statin  - resume PTA Plavix once verified, continue to hold losartan and metoprolol for permissive HTN    Parkinson's:  Resume PTA  Sinemet and Cogentin once verified.    Non-hodgkins lymphoma, in remission:  Last chemotherapy 2 years ago per wife.  No known recurrence.    Chronic abdominal pain:  Resume PTA MS Contin and Norco once verified, assuming mental status allows.    BPH:  Resume Flomax and oxybutinin once verified.    Depression and anxiety:  Resume mirtazepine once verified, resume lorazepam as appropriate.    GERD:  Resume PPI once verified.    Gout:  Resume allopurinol once verified.    Tobacco dependence:  Encourage cessation.    DVT Prophylaxis: Pneumatic Compression Devices  Code Status: Full Code per discussion with wife    # Pain Assessment:  Current Pain Score 5/19/2018   Pain score (0-10) 6   - Brent is unable to participate in a collaborative plan for pain management due to encephalopathy.   - prn tylenol    Disposition: Expected discharge in 3 days once work-up complete.    Wili Ruiz    Primary Care Physician   ROGELIO GO    Chief Complaint   encephalopathy    History is obtained from discussion with ED physician, patient's wife and chart review    History of Present Illness   Brent Vega is a 73 year old male who presents with altered mental status.  The patient is unable to provide any history secondary to his encephalopathy, it is not clear how much of this is due to his underlying pathology vs receiving 3 mg ativan for agitation at outside ED.  Spoke with the patient's wife via phone, and she reports that he came up from [place], where they live, on Friday night to help their nephew move.  This morning, the nephew noticed that the patient had increased clumsiness.  Over the course of the morning, they noted that the clumsiness was increasing and he was becoming disoriented.  He was unable to remember where he was, however he got there and how he was going to get home.  Due to the confusion, they ultimately decided that they would try to drive him home, but while en route his confusion became more  severe and they ultimately decided to present him for medical evaluation.  Of note, she reports about 1 month ago he became more confused at home, but this cleared after about 2 days and he returned to baseline.    The patient is able to participate some in review of systems.  He endorses dysarthria and some word-finding difficulty, denies any focal weakness or paresthesias.  Reports some mild abdominal pain in the periumbilical area, but it appears this is his chronic pain per his wife.  He denies any dyspnea, dysuria, sore throat, rash, nausea, diarrhea.  Denies any other complaints.    Past Medical History    Parkinson's disease  CVA without residual deficits  CAD status post PCI ×2  Non-Hodgkin's lymphoma, in remission  Hypertension  Hyperlipidemia  Gout  BPH  Depression  GERD  Chronic abdominal pain  Possible cognitive impairment    Past Surgical History   Appendectomy  Orchiectomy  Port placement  Cataract surgery    Prior to Admission Medications   Prior to Admission Medications   Prescriptions Last Dose Informant Patient Reported? Taking?   ALLOPURINOL PO   Yes No   Sig: Take 300 mg by mouth daily   ALPRAZolam (XANAX PO)   Yes No   Sig: Take 0.25 mg by mouth 3 times daily as needed for anxiety   Acetaminophen (TYLENOL PO)   Yes No   Sig: Take 325 mg by mouth   Atorvastatin Calcium (LIPITOR PO)   Yes No   Sig: Take 80 mg by mouth daily   Clopidogrel Bisulfate (PLAVIX PO)   Yes No   Sig: Take 75 mg by mouth   Divalproex Sodium (DEPAKOTE PO)   Yes No   Sig: Take 500 mg by mouth daily   HYDROcodone-acetaminophen (NORCO) 5-325 MG per tablet   Yes No   Sig: Take 1 tablet by mouth every 6 hours as needed for severe pain (1/2-1tab bid prn)   LOSARTAN POTASSIUM PO   Yes No   Sig: Take 25 mg by mouth daily   Lansoprazole (PREVACID PO)   Yes No   Sig: Take 30 mg by mouth   MAGNESIUM OXIDE PO   Yes No   Sig: Take 100 mg by mouth daily   Metoprolol Succinate (TOPROL XL PO)   Yes No   Sig: Take 100 mg by mouth daily    Morphine Sulfate (MS CONTIN PO)   Yes No   Sig: Take 15 mg by mouth every 12 hours   Ondansetron (ZOFRAN ODT PO)   Yes No   Sig: Take 4 mg by mouth   aspirin 325 MG tablet   Yes No   Sig: Take 325 mg by mouth daily   carbidopa-levodopa (SINEMET)  MG per tablet   Yes No   Sig: Take 1 tablet by mouth 3 times daily   diphenoxylate-atropine (LOMOTIL) 2.5-0.025 MG per tablet   Yes No   Sig: Take 1 tablet by mouth 4 times daily as needed for diarrhea   hydrochlorothiazide (MICROZIDE) 12.5 MG capsule   Yes No   Sig: Take 25 mg by mouth daily   lactulose (CHRONULAC) 10 GM/15ML solution   Yes No   Sig: Take 30 g by mouth daily   nicotine (NICODERM CQ) 21 MG/24HR 24 hr patch   Yes No   Sig: Place 1 patch onto the skin every 24 hours   oxybutynin (DITROPAN-XL) 10 MG 24 hr tablet   Yes No   Sig: Take 10 mg by mouth daily   sucralfate (CARAFATE) 1 GM tablet   Yes No   Sig: Take 1 g by mouth 4 times daily      Facility-Administered Medications: None     Allergies   Allergies   Allergen Reactions     Chantix [Varenicline] Unknown     Gabapentin Unknown       Social History   I have personally reviewed the social history with the patient showing 55-pack-year history of tobacco abuse, currently smoking one pack per day.  There is no alcohol or illicit drug use per his wife.    Family History   Father and mother both experienced strokes.    Review of Systems   The 10 point Review of Systems is negative other than noted in the HPI or here.     Physical Exam   Temp: 99.1  F (37.3  C) Temp src: Oral BP: 134/73   Heart Rate: 85 Resp: 16 SpO2: 98 % O2 Device: None (Room air)    Vital Signs with Ranges  Temp:  [99.1  F (37.3  C)-101.7  F (38.7  C)] 99.1  F (37.3  C)  Pulse:  [94] 94  Heart Rate:  [84-99] 85  Resp:  [12-82] 16  BP: (101-162)/() 134/73  SpO2:  [77 %-98 %] 98 %  0 lbs 0 oz    Constitutional: well developed, well nourished male in no acute distress  Eyes: pupils equal, round and reactive to light, no  icterus  HEENT: head normocephalic, atraumatic, mucous membranes moist, no cervical lymphadenopathy  Respiratory: lungs clear to auscultation bilaterally, no crackles or wheezes, no tachypnea  Cardiovascular: regular rate and rhythm, normal S1/S2, no murmur, rubs or gallops  GI: abdomen soft, reports mild john-umbilical tenderness, non-distended, normal bowel sounds, no hepatosplenomegaly  Lymph/Hematologic: No peripheral edema  Skin: No rash or bruising  Musculoskeletal: Extremities warm and well perfused, port in left upper chest without overlying erythema  Neurologic: sleepy, arouses to voice but quickly falls back to sleep, cranial nerves appear intact (was not able to test vision), 4/5 strength right upper extremity with right upper extremity pronator drift, sensation to light touch appears intact, could not test coordination as patient could not follow these commands      Data   Data reviewed today:  I personally reviewed no images or EKG's today.    Recent Labs  Lab 05/19/18  1532   WBC 8.4   HGB 11.5*   MCV 82         POTASSIUM 3.3*   CHLORIDE 105   CO2 23   BUN 16   CR 1.03   ANIONGAP 10   KENNEY 9.8   GLC 97       Recent Results (from the past 24 hour(s))   XR Chest 2 Views    Narrative    PROCEDURE:  XR CHEST 2 VW    HISTORY:  fever; .     COMPARISON:  None.    FINDINGS:   The cardiac silhouette is normal in size. The pulmonary vasculature is  normal.  The lungs are clear. No pleural effusion or pneumothorax. Is  an infusion port with its tip in superior vena cava.      Impression    IMPRESSION:  No acute cardiopulmonary disease.      GWENDOLYN BARTON MD   CT Head w/o Contrast    Narrative    PROCEDURE: CT HEAD W/O CONTRAST     HISTORY: change in neuro status with fever and increased tremors; .    COMPARISON: None.    TECHNIQUE:  Helical images of the head from the foramen magnum to the  vertex were obtained without contrast.    FINDINGS: The ventricles and sulci are normal in volume. There is  a  small area of increased density seen on axial image #22 medially in  the left parietal lobe. There is no associated mass effect. Further  evaluation with a small area with MR scanning is recommended. It  measures approximately 8 mm in maximal diameter. There is abnormal  low-density seen in the left parietal lobe on axial image #15 and the  possibility of an evolving left thalamic infarct exists. There is a  small lacunar infarcts seen in the external capsule on the right. The  brainstem and cerebellum appear normal.    The grey-white matter interface is preserved.    The calvarium is intact. The mastoid air cells are clear.  The  visualized paranasal sinuses are clear.      Impression    IMPRESSION:   1. Small area of high density seen in the left parietal cortex  medially may represent a small vascular malformation I would recommend  however that the MR scan be obtained to further evaluate this area.  2. Low-density area in the  left thalmus. The possibility of an  evolving left thalamic infarct exists. MR scanning would confirm or  rule this out      GWENDOLYN BARTON MD

## 2018-05-20 NOTE — PROGRESS NOTES
Spoke with MRI tech, patient became very agitated in MRI, trying to pull apart the machine, unable to lie still, unable to be redirected. MRI tech says cannot proceed with test. Will update MDs.    Paged Dr. Calvin Best, neurology to update.    Updated Dr. Ruiz.     MRI order changed to routine, will attempt as patient condition allows

## 2018-05-20 NOTE — PLAN OF CARE
Problem: Stroke (Ischemic) (Adult)  Goal: Signs and Symptoms of Listed Potential Problems Will be Absent, Minimized or Managed (Stroke)  Signs and symptoms of listed potential problems will be absent, minimized or managed by discharge/transition of care (reference Stroke (Ischemic) (Adult) CPG).   Outcome: Improving  A&Ox3, disoriented to situation. Forgetful. Lethargic all night, easy to arouse this am. Had some word finding and RUE deficits in night, seem to have resolved this am. Speech garbled but logical. Potassium replaced, waiting for recheck. NPO. Speech consult.

## 2018-05-20 NOTE — PLAN OF CARE
Problem: Patient Care Overview  Goal: Plan of Care/Patient Progress Review  Discharge Planner SLP   Patient plan for discharge: Return to [place].  Current status: Bedside swallow evaluation completed. Patient presents with mild to moderate oral and pharyngeal dysphagia at bedside. Patient mild oral motor deficits with ROM/coordination. He demonstrated premature entry of thin liquids via the cup without overt Sx of aspiration. Lingual incoordination and tongue thushing forward with pudding, semi-solids and solids. Mild to moderate oral residue on mid tongue and left lateral sulci. Minimal throat clearing noted after the swallow and suspect some retention. He was missing his lower denture at the time of the evaluation prolonging mastication. Impulsive behaviors and confusion is increasing his risk for aspiration. Recommend: 1. Dysphagia Diet level 3 with thin liquids. 2. Upright or in a chair, no straws, slow rate, check for oral residue and double swallow. 3. SLP will f/u for diet tolerance, advancement and swallow strategy training.   Barriers to return to prior living situation: Cognition/safety   Recommendations for discharge: Pending further work up.   Rationale for recommendations: Anticipate swallow goals to be met as an IP. Cognitive linguistic evaluation can be deferred to the next level of care as indicated. Patient is not at his baseline.        Entered by: Charis Alba 05/20/2018 9:55 AM

## 2018-05-20 NOTE — PHARMACY-ADMISSION MEDICATION HISTORY
Admission medication history interview status for the 5/19/2018  admission is complete. See EPIC admission navigator for prior to admission medications     Medication history source reliability:Poor    Actions taken by pharmacist (provider contacted, etc): Contacted retail pharmacy - closed until Monday at 9AM. Contacted pt's wife and went over the 13 medications listed, she wasn't sure if he was taking all the medications verified.     Additional medication history information not noted on PTA med list :None    Medication reconciliation/reorder completed by provider prior to medication history? No    Time spent in this activity: 35 minutes    Prior to Admission medications    Medication Sig Last Dose Taking? Auth Provider   Atorvastatin Calcium (LIPITOR PO) Take 80 mg by mouth daily  Yes Reported, Patient   BENZTROPINE MESYLATE PO Take 2 mg by mouth 2 times daily  Yes Unknown, Entered By History   carbidopa-levodopa (SINEMET)  MG per tablet Take 1.5 tablets by mouth 3 times daily   Yes Reported, Patient   diphenoxylate-atropine (LOMOTIL) 2.5-0.025 MG per tablet Take 2 tablets by mouth 4 times daily as needed for diarrhea   Yes Reported, Patient   Divalproex Sodium (DEPAKOTE PO) Take 500 mg by mouth daily  Yes Reported, Patient   LOSARTAN POTASSIUM PO Take 25 mg by mouth daily  Yes Reported, Patient   Metoprolol Succinate (TOPROL XL PO) Take 100 mg by mouth daily  Yes Reported, Patient   Morphine Sulfate (MS CONTIN PO) Take 15 mg by mouth 2 times daily   Yes Reported, Patient   OMEPRAZOLE PO Take 40 mg by mouth every morning  Yes Unknown, Entered By History   Ondansetron (ZOFRAN ODT PO) Take 4 mg by mouth every 8 hours as needed   Yes Reported, Patient   oxybutynin (DITROPAN-XL) 10 MG 24 hr tablet Take 10 mg by mouth daily  Yes Reported, Patient   TAMSULOSIN HCL PO Take 0.4 mg by mouth At Bedtime  Yes Unknown, Entered By History   TRAMADOL HCL PO Take 50 mg by mouth 2 times daily as needed for headaches or  moderate to severe pain  Yes Unknown, Entered By History   Acetaminophen (TYLENOL PO) Take 325 mg by mouth   Reported, Patient   ALLOPURINOL PO Take 300 mg by mouth daily   Reported, Patient   ALPRAZolam (XANAX PO) Take 0.25 mg by mouth 3 times daily as needed for anxiety   Reported, Patient   aspirin 325 MG tablet Take 325 mg by mouth daily   Reported, Patient   Clopidogrel Bisulfate (PLAVIX PO) Take 75 mg by mouth   Reported, Patient   hydrochlorothiazide (MICROZIDE) 12.5 MG capsule Take 25 mg by mouth daily   Reported, Patient   HYDROcodone-acetaminophen (NORCO) 5-325 MG per tablet Take 1 tablet by mouth every 6 hours as needed for severe pain (1/2-1tab bid prn)   Reported, Patient   lactulose (CHRONULAC) 10 GM/15ML solution Take 30 g by mouth daily   Reported, Patient   Lansoprazole (PREVACID PO) Take 30 mg by mouth   Reported, Patient   MAGNESIUM OXIDE PO Take 100 mg by mouth daily   Reported, Patient   nicotine (NICODERM CQ) 21 MG/24HR 24 hr patch Place 1 patch onto the skin every 24 hours   Reported, Patient   sucralfate (CARAFATE) 1 GM tablet Take 1 g by mouth 4 times daily   Reported, Patient

## 2018-05-20 NOTE — ED NOTES
Pt transferred to [facility], wife was updated on pts disposition per MD

## 2018-05-20 NOTE — PROGRESS NOTES
05/20/18 1100   Quick Adds   Type of Visit Initial Occupational Therapy Evaluation   Living Environment   Lives With spouse   Living Arrangements house   Home Accessibility stairs to enter home  (full flight of stairs to enter home)   Living Environment Comment Pt is retired, lives at home with his wife, house is above garage   Self-Care   Usual Activity Tolerance good   Current Activity Tolerance moderate   Regular Exercise no   Activity/Exercise/Self-Care Comment Pt is active at baseline, does yardwork and fishes   General Information   Onset of Illness/Injury or Date of Surgery - Date 05/19/18   Referring Physician Wili Ruiz MD   Patient/Family Goals Statement Pt would like to get home   Additional Occupational Profile Info/Pertinent History of Current Problem Pt is a former , had to retire when he had his cancer   Precautions/Limitations fall precautions   General Info Comments Pt has Parkinson's, BUE tremor, as well as some tongue thrust to the R   Cognitive Status Examination   Orientation orientation to person, place and time   Level of Consciousness alert   Able to Follow Commands WNL/WFL   Personal Safety (Cognitive) WNL/WFL   Cognitive Comment Pt's neurologist notes some higher level cognitive deficits per chart   Visual Perception   Visual Perception No deficits were identified;Wears glasses   Pain Assessment   Patient Currently in Pain No   Range of Motion (ROM)   ROM Comment BUE is WFL   Strength   Strength Comments BUE is WFL   Mobility   Bed Mobility Comments Pt is Mod I with bed mobility   Transfer Skill: Sit to Stand   Level of [place]: Sit/Stand stand-by assist   Physical Assist/Nonphysical Assist: Sit/Stand set-up required;verbal cues   Transfer Skill: Sit to Stand full weight-bearing   Transfer Skill: Toilet Transfer   Level of [place]: Toilet stand-by assist   Tub/Shower Transfer   Tub/Shower Transfer Comments SBA   Balance   Balance Comments No LOB noted   Upper Body  "Dressing   Level of [place]: Dress Upper Body independent   Physical Assist/Nonphysical Assist: Dress Upper Body set-up required   Lower Body Dressing   Level of [place]: Dress Lower Body stand-by assist   Physical Assist/Nonphysical Assist: Dress Lower Body set-up required   Toileting   Level of [place]: Toilet independent   Grooming   Level of [place]: Grooming independent   Physical Assist/Nonphysical Assist: Grooming set-up required   Eating/Self Feeding   Level of [place]: Eating independent   Physical Assist/Nonphysical Assist: Eating set-up required   Instrumental Activities of Daily Living (IADL)   Previous Responsibilities housekeeping;laundry;shopping;yardwork;medication management;finances;driving   Activities of Daily Living Analysis   Impairments Contributing to Impaired Activities of Daily Living cognition impaired;balance impaired   General Therapy Interventions   Planned Therapy Interventions ADL retraining   Clinical Impression   Criteria for Skilled Therapeutic Interventions Met yes, treatment indicated   OT Diagnosis Decreased safety with ADL tasks   Influenced by the following impairments Pt had a significant mental status change, as well as increased usteadiness   Assessment of Occupational Performance 1-3 Performance Deficits   Identified Performance Deficits Pt is functioning below baseline in higer level balance task of showering, LE dressing   Clinical Decision Making (Complexity) Low complexity   Therapy Frequency daily   Predicted Duration of Therapy Intervention (days/wks) 1 session   Anticipated Discharge Disposition Home with Assist  (wife to provide as needed)   Risks and Benefits of Treatment have been explained. Yes   Patient, Family & other staff in agreement with plan of care Yes   Clinical Impression Comments Skilled OT intervention is warranted for 1 session to address presenting deficits   [facility] AM-PAC  \"6 Clicks\" Daily Activity Inpatient " Short Form   1. Putting on and taking off regular lower body clothing? 3 - A Little   2. Bathing (including washing, rinsing, drying)? 3 - A Little   3. Toileting, which includes using toilet, bedpan or urinal? 4 - None   4. Putting on and taking off regular upper body clothing? 4 - None   5. Taking care of personal grooming such as brushing teeth? 4 - None   6. Eating meals? 4 - None   Daily Activity Raw Score (Score out of 24.Lower scores equate to lower levels of function) 22   Total Evaluation Time   Total Evaluation Time (Minutes) 10

## 2018-05-21 ENCOUNTER — APPOINTMENT (OUTPATIENT)
Dept: PHYSICAL THERAPY | Facility: CLINIC | Age: 73
DRG: 071 | End: 2018-05-21
Attending: HOSPITALIST
Payer: MEDICARE

## 2018-05-21 ENCOUNTER — APPOINTMENT (OUTPATIENT)
Dept: CARDIOLOGY | Facility: CLINIC | Age: 73
DRG: 071 | End: 2018-05-21
Attending: HOSPITALIST
Payer: MEDICARE

## 2018-05-21 ENCOUNTER — APPOINTMENT (OUTPATIENT)
Dept: SPEECH THERAPY | Facility: CLINIC | Age: 73
DRG: 071 | End: 2018-05-21
Attending: HOSPITALIST
Payer: MEDICARE

## 2018-05-21 VITALS
TEMPERATURE: 97.9 F | HEART RATE: 62 BPM | SYSTOLIC BLOOD PRESSURE: 157 MMHG | WEIGHT: 170.64 LBS | RESPIRATION RATE: 16 BRPM | DIASTOLIC BLOOD PRESSURE: 94 MMHG | BODY MASS INDEX: 25.95 KG/M2 | OXYGEN SATURATION: 99 %

## 2018-05-21 LAB
ALBUMIN SERPL-MCNC: 3.1 G/DL (ref 3.4–5)
ALP SERPL-CCNC: 94 U/L (ref 40–150)
ALT SERPL W P-5'-P-CCNC: 8 U/L (ref 0–70)
AMMONIA PLAS-SCNC: <10 UMOL/L (ref 10–50)
ANION GAP SERPL CALCULATED.3IONS-SCNC: 10 MMOL/L (ref 3–14)
AST SERPL W P-5'-P-CCNC: 20 U/L (ref 0–45)
BILIRUB SERPL-MCNC: 0.6 MG/DL (ref 0.2–1.3)
BUN SERPL-MCNC: 8 MG/DL (ref 7–30)
CALCIUM SERPL-MCNC: 8.8 MG/DL (ref 8.5–10.1)
CHLORIDE SERPL-SCNC: 111 MMOL/L (ref 94–109)
CO2 SERPL-SCNC: 21 MMOL/L (ref 20–32)
CREAT SERPL-MCNC: 0.7 MG/DL (ref 0.66–1.25)
ERYTHROCYTE [DISTWIDTH] IN BLOOD BY AUTOMATED COUNT: 15.6 % (ref 10–15)
GFR SERPL CREATININE-BSD FRML MDRD: >90 ML/MIN/1.7M2
GLUCOSE BLDC GLUCOMTR-MCNC: 98 MG/DL (ref 70–99)
GLUCOSE SERPL-MCNC: 139 MG/DL (ref 70–99)
HCT VFR BLD AUTO: 36.5 % (ref 40–53)
HGB BLD-MCNC: 11.9 G/DL (ref 13.3–17.7)
MCH RBC QN AUTO: 26.6 PG (ref 26.5–33)
MCHC RBC AUTO-ENTMCNC: 32.6 G/DL (ref 31.5–36.5)
MCV RBC AUTO: 82 FL (ref 78–100)
PLATELET # BLD AUTO: 231 10E9/L (ref 150–450)
POTASSIUM SERPL-SCNC: 3.4 MMOL/L (ref 3.4–5.3)
PROT SERPL-MCNC: 7 G/DL (ref 6.8–8.8)
RBC # BLD AUTO: 4.48 10E12/L (ref 4.4–5.9)
SODIUM SERPL-SCNC: 142 MMOL/L (ref 133–144)
TROPONIN I SERPL-MCNC: 0.43 UG/L (ref 0–0.04)
WBC # BLD AUTO: 5.7 10E9/L (ref 4–11)

## 2018-05-21 PROCEDURE — 92526 ORAL FUNCTION THERAPY: CPT | Mod: GN | Performed by: SPEECH-LANGUAGE PATHOLOGIST

## 2018-05-21 PROCEDURE — 85027 COMPLETE CBC AUTOMATED: CPT | Performed by: HOSPITALIST

## 2018-05-21 PROCEDURE — 25000132 ZZH RX MED GY IP 250 OP 250 PS 637: Mod: GY | Performed by: HOSPITALIST

## 2018-05-21 PROCEDURE — 82140 ASSAY OF AMMONIA: CPT | Performed by: HOSPITALIST

## 2018-05-21 PROCEDURE — 25500064 ZZH RX 255 OP 636: Performed by: HOSPITALIST

## 2018-05-21 PROCEDURE — 25000132 ZZH RX MED GY IP 250 OP 250 PS 637: Mod: GY | Performed by: PSYCHIATRY & NEUROLOGY

## 2018-05-21 PROCEDURE — A9270 NON-COVERED ITEM OR SERVICE: HCPCS | Mod: GY | Performed by: HOSPITALIST

## 2018-05-21 PROCEDURE — 40000264 ECHO COMPLETE BUBBLE STUDY WITH OPTISON

## 2018-05-21 PROCEDURE — 80053 COMPREHEN METABOLIC PANEL: CPT | Performed by: HOSPITALIST

## 2018-05-21 PROCEDURE — 97116 GAIT TRAINING THERAPY: CPT | Mod: GP | Performed by: PHYSICAL THERAPY ASSISTANT

## 2018-05-21 PROCEDURE — 99239 HOSP IP/OBS DSCHRG MGMT >30: CPT | Performed by: HOSPITALIST

## 2018-05-21 PROCEDURE — A9270 NON-COVERED ITEM OR SERVICE: HCPCS | Mod: GY | Performed by: PSYCHIATRY & NEUROLOGY

## 2018-05-21 PROCEDURE — 84484 ASSAY OF TROPONIN QUANT: CPT | Performed by: HOSPITALIST

## 2018-05-21 PROCEDURE — 40000193 ZZH STATISTIC PT WARD VISIT: Performed by: PHYSICAL THERAPY ASSISTANT

## 2018-05-21 PROCEDURE — 40000225 ZZH STATISTIC SLP WARD VISIT: Performed by: SPEECH-LANGUAGE PATHOLOGIST

## 2018-05-21 PROCEDURE — 93306 TTE W/DOPPLER COMPLETE: CPT | Mod: 26 | Performed by: INTERNAL MEDICINE

## 2018-05-21 PROCEDURE — 36415 COLL VENOUS BLD VENIPUNCTURE: CPT | Performed by: HOSPITALIST

## 2018-05-21 RX ORDER — CLOPIDOGREL BISULFATE 75 MG/1
75 TABLET ORAL DAILY
Qty: 30 TABLET | Refills: 0 | Status: SHIPPED | OUTPATIENT
Start: 2018-05-22

## 2018-05-21 RX ORDER — ROSUVASTATIN CALCIUM 40 MG/1
40 TABLET, COATED ORAL DAILY
Qty: 30 TABLET | Refills: 0 | Status: SHIPPED | OUTPATIENT
Start: 2018-05-22

## 2018-05-21 RX ORDER — ASPIRIN 81 MG/1
81 TABLET, CHEWABLE ORAL DAILY
Qty: 36 TABLET | Refills: 0 | Status: SHIPPED | OUTPATIENT
Start: 2018-05-22

## 2018-05-21 RX ADMIN — Medication 1 HALF-TAB: at 15:58

## 2018-05-21 RX ADMIN — LACTULOSE 30 G: 20 SOLUTION ORAL at 09:45

## 2018-05-21 RX ADMIN — CARBIDOPA AND LEVODOPA 1 TABLET: 25; 100 TABLET ORAL at 21:20

## 2018-05-21 RX ADMIN — ASPIRIN 81 MG 81 MG: 81 TABLET ORAL at 09:41

## 2018-05-21 RX ADMIN — CARBIDOPA AND LEVODOPA 1 TABLET: 25; 100 TABLET ORAL at 15:58

## 2018-05-21 RX ADMIN — HUMAN ALBUMIN MICROSPHERES AND PERFLUTREN 3 ML: 10; .22 INJECTION, SOLUTION INTRAVENOUS at 08:45

## 2018-05-21 RX ADMIN — Medication 1 HALF-TAB: at 21:21

## 2018-05-21 RX ADMIN — OMEPRAZOLE 40 MG: 20 CAPSULE, DELAYED RELEASE ORAL at 09:41

## 2018-05-21 RX ADMIN — CARBIDOPA AND LEVODOPA 1 TABLET: 25; 100 TABLET ORAL at 09:42

## 2018-05-21 RX ADMIN — ROSUVASTATIN CALCIUM 40 MG: 20 TABLET, FILM COATED ORAL at 09:40

## 2018-05-21 RX ADMIN — Medication 1 HALF-TAB: at 09:43

## 2018-05-21 RX ADMIN — ALLOPURINOL 300 MG: 300 TABLET ORAL at 09:41

## 2018-05-21 RX ADMIN — CLOPIDOGREL 75 MG: 75 TABLET, FILM COATED ORAL at 09:40

## 2018-05-21 ASSESSMENT — ACTIVITIES OF DAILY LIVING (ADL)
ADLS_ACUITY_SCORE: 14

## 2018-05-21 ASSESSMENT — VISUAL ACUITY
OU: NORMAL ACUITY
OU: NORMAL ACUITY

## 2018-05-21 NOTE — PLAN OF CARE
Problem: Patient Care Overview  Goal: Plan of Care/Patient Progress Review  Outcome: No change  A&O x3 but forgetful of the time of the season. Neuros stable. Bilateral UEs tremors.  Denies pain when asked. Pt is up with A1/safety belt/walker. MRI complete. No sign of ischemic, ensure that a disc of the MRI is sent home with Pt for review by his home neurologist at D/C per Neurologist's notes. Pt is from [place].

## 2018-05-21 NOTE — PLAN OF CARE
Problem: Patient Care Overview  Goal: Plan of Care/Patient Progress Review  Outcome: Improving  A&O x4. Neuros intact, baseline tremors to hands. VSS. Tele SR w/ BBB. DD3 w/ TL  diet. Up with 1, GB W. Denies pain. Plan for d/c home pending, pt needing transport home as he lives in [place] and wife is unable to drive.

## 2018-05-21 NOTE — PLAN OF CARE
Problem: Patient Care Overview  Goal: Plan of Care/Patient Progress Review  Discharge Planner SLP   Patient plan for discharge: Patient wants to go home.   Current status: SLP: Patient seen for swallow treatment while upright in bed. He was able to tolerate small single sips of water without overt Sx of aspiration. Given a trial of toast and improvement noted in mastication and bolus coordination. Improved oral clearing and no overt Sx of aspiration. Recommend: 1. Advance to regular textures and continue with thin liquids. 2. Up in a chair for meals, no straws, small bites/sips, alternate liquids/solids as needed. 3. SLP will f/u for one more session to insure diet tolerance.   Barriers to return to prior living situation: None per speech perspective.   Recommendations for discharge: Home with assistance.  Rationale for recommendations: Will not need skilled intervention at discharge. Near his baseline.        Entered by: Charis Alba 05/21/2018 9:29 AM

## 2018-05-21 NOTE — PHARMACY-ADMISSION MEDICATION HISTORY
Admission medication history interview status for the 5/19/2018  admission is complete. See EPIC admission navigator for prior to admission medications     Medication history source reliability:Good    Actions taken by pharmacist (provider contacted, etc):None     Additional medication history information not noted on PTA med list :None    Medication reconciliation/reorder completed by provider prior to medication history? Yes    Time spent in this activity: 15    Prior to Admission medications    Medication Sig Last Dose Taking? Auth Provider   Acetaminophen (TYLENOL PO) Take 325 mg by mouth every 6 hours as needed  Past Week at Unknown time Yes Reported, Patient   ALLOPURINOL PO Take 300 mg by mouth daily 5/20/2018 at Unknown time Yes Reported, Patient   ALPRAZolam (XANAX PO) Take 0.25 mg by mouth 3 times daily as needed for anxiety Past Week at Unknown time Yes Reported, Patient   aspirin 325 MG tablet Take 325 mg by mouth daily 5/20/2018 at Unknown time Yes Reported, Patient   Atorvastatin Calcium (LIPITOR PO) Take 80 mg by mouth daily 5/20/2018 at Unknown time Yes Reported, Patient   BENZTROPINE MESYLATE PO Take 2 mg by mouth 2 times daily 5/20/2018 at Unknown time Yes Unknown, Entered By History   carbidopa-levodopa (SINEMET)  MG per tablet Take 1.5 tablets by mouth 3 times daily  5/20/2018 at Unknown time Yes Reported, Patient   diphenoxylate-atropine (LOMOTIL) 2.5-0.025 MG per tablet Take 2 tablets by mouth 4 times daily as needed for diarrhea  Past Week at Unknown time Yes Reported, Patient   hydrochlorothiazide (MICROZIDE) 12.5 MG capsule Take 25 mg by mouth daily 5/20/2018 at Unknown time Yes Reported, Patient   HYDROcodone-acetaminophen (NORCO) 5-325 MG per tablet Take 1 tablet by mouth every 6 hours as needed for severe pain (1/2-1tab bid prn) Past Week at Unknown time Yes Reported, Patient   lactulose (CHRONULAC) 10 GM/15ML solution Take 30 g by mouth daily 5/20/2018 at Unknown time Yes Reported,  Patient   Lansoprazole (PREVACID PO) Take 30 mg by mouth every morning (before breakfast)  5/20/2018 at Unknown time Yes Reported, Patient   LOSARTAN POTASSIUM PO Take 25 mg by mouth daily 5/20/2018 at Unknown time Yes Reported, Patient   MAGNESIUM OXIDE PO Take 100 mg by mouth daily 5/20/2018 at Unknown time Yes Reported, Patient   Metoprolol Succinate (TOPROL XL PO) Take 100 mg by mouth daily 5/20/2018 at Unknown time Yes Reported, Patient   Morphine Sulfate (MS CONTIN PO) Take 15 mg by mouth 2 times daily  5/20/2018 at Unknown time Yes Reported, Patient   nicotine (NICODERM CQ) 21 MG/24HR 24 hr patch Place 1 patch onto the skin every 24 hours Past Week at Unknown time Yes Reported, Patient   Ondansetron (ZOFRAN ODT PO) Take 4 mg by mouth every 8 hours as needed  Past Week at Unknown time Yes Reported, Patient   oxybutynin (DITROPAN-XL) 10 MG 24 hr tablet Take 10 mg by mouth daily 5/20/2018 at Unknown time Yes Reported, Patient   sucralfate (CARAFATE) 1 GM tablet Take 1 g by mouth 4 times daily 5/20/2018 at Unknown time Yes Reported, Patient   TAMSULOSIN HCL PO Take 0.4 mg by mouth At Bedtime 5/19/2018 Yes Unknown, Entered By History   TRAMADOL HCL PO Take 50 mg by mouth 2 times daily as needed for headaches or moderate to severe pain Past Week at Unknown time Yes Unknown, Entered By History

## 2018-05-21 NOTE — PLAN OF CARE
Problem: Patient Care Overview  Goal: Plan of Care/Patient Progress Review  Discharge Planner PT   Patient plan for discharge: Home with spouse  Current status: Pt is IND with bed mobility.  Gait training x 400 ft without use of device with close SBA.  Mild unsteadiness noted especially with head turns or when distracted.  Pt performed stairs with SBA.    Barriers to return to prior living situation: Falls risk with balance deficits, deficits with safety awareness  Recommendations for discharge: TCU per plan established by the PT.  However, pt is planning to return home this evening with wife. OPPT recommended.  Rationale for recommendations: Pt was previously IND with mobility. Current mobility puts pt at falls risk and pt does not have anyone who can assist at home as wife has somewhat impaired mobility. Pt would currently benefit from TCU to address deficits; however is planning to return to home tonight with wife.  OPPT recommended to progress strength and balance.         Entered by: Tabby Valerio 05/21/2018 3:23 PM       Pt is discharging home tonight.  OP PT recommended.  PT goals met.    Physical Therapy Discharge Summary    Reason for therapy discharge:    Discharged to home with outpatient therapy.    Progress towards therapy goal(s). See goals on Care Plan in [facility] electronic health record for goal details.  Goals met    Therapy recommendation(s):    Continued therapy is recommended.  Rationale/Recommendations:  to progress strength and balance to decrease fall risk and improve safety at home.

## 2018-05-21 NOTE — PROGRESS NOTES
[facility]  Hospitalist Progress Note        Tony Gupta, DO  05/21/2018        Interval History:      Patient states that he is doing well.          Assessment and Plan:        Brent Vega is a 73 year old male with past history CVA, CAD, HTN, hyperlipidemia, Parkinsons, possible cognitive impairment, hx of lymphoma among other chronic medical conditions who transfers from [facility] with encephalopathy and possible CVA.        Acute ecephalopathy: Highest on differential is stroke.  Unclear how much of his confusion at arrival is related to 3mg lorazepam he received for agitation at outside ED.  Had fever at outside ED, so infectious source is also a strong consideration.  Note his outpatient Neurologist has documented low SLUMS score. Head CT with area of low density in left thalamus which may be stroke.  Also noted area of high density in left parietal region, recommended MRI for further evaluation.  - MRI head completed. No sign of acute stroke.   - Neurology following.   - Possible discharge later today if ok with Neurology.   - PT/OT  - daily aspirin and statin  - PTA Plavix       Hypokalemia:  K 3.3 at outside ED.  - replace per protocol      Fever, resolved:  Febrile to 101.7 at outside ED.  CXR and UA unremarkable, blood cultures were not drawn.  No leukocytosis.  No infectious complaints (though limited reliability of ROS) and no findings on exam to suggest infectious source.  No meningeal signs on exam.  - Monitor.   - follow fever and CBC; blood cultures if spikes again  - if recurrent fever, may need to consider LP      Hx of CVA:  Occurred about 2007.  No residual deficits per wife.  - Per Neurology.       CAD s/p PCI x2:  Details of placement unknown.  Stress test 4/2017 normal with EF 60-65%.  - Permissive hypertension at this time, defer to Neurology.       Parkinson's: Continue Sinemet and Cogentin.      Non-hodgkins lymphoma, in remission:  Last chemotherapy  2 years ago per wife.  No known recurrence.  - Monitor.       Chronic abdominal pain: Stable.   - Hold PTA MS Contin   - Resumed PTA Norco      BPH:  Flomax and hold oxybutinin.      Depression and anxiety:  Hold mirtazepine, prn ativan.       GERD: PPI       Gout:  allopurinol       Tobacco dependence:  Encouraged cessation.      DVT Prophylaxis: Pneumatic Compression Devices     Code Status: Full Code       Disposition: Expected discharge today or tomorrow pending Neurology clearance.     Pain: # Pain Assessment:  Current Pain Score 2018   Patient currently in pain? denies   Pain score (0-10) -   Brent galvez pain level was assessed and he currently denies pain.                     Physical Exam:      Heart Rate: 54    Blood pressure 142/74, temperature 97.9  F (36.6  C), temperature source Oral, resp. rate 18, weight 77.4 kg (170 lb 10.2 oz), SpO2 97 %.    Vitals:    18 0300   Weight: 77.4 kg (170 lb 10.2 oz)       Vital Sign Ranges  Temperature Temp  Av.1  F (36.7  C)  Min: 97.9  F (36.6  C)  Max: 98.3  F (36.8  C)   Blood pressure Systolic (24hrs), Av , Min:142 , Max:153        Diastolic (24hrs), Av, Min:74, Max:94      Pulse No Data Recorded   Respirations Resp  Av  Min: 16  Max: 18   Pulse oximetry SpO2  Av %  Min: 97 %  Max: 97 %     Vital Signs with Ranges  Temp:  [97.9  F (36.6  C)-98.3  F (36.8  C)] 97.9  F (36.6  C)  Heart Rate:  [54-56] 54  Resp:  [16-18] 18  BP: (142-153)/(74-94) 142/74  SpO2:  [97 %] 97 %    I/O Last 3 Shifts:   I/O last 3 completed shifts:  In: 3898 [P.O.:1420; I.V.:2478]  Out: -     I/O past 24 hours:     Intake/Output Summary (Last 24 hours) at 18 0826  Last data filed at 18 0600   Gross per 24 hour   Intake             3898 ml   Output                0 ml   Net             3898 ml     GENERAL: Alert and oriented. NAD. Conversational, appropriate. Less tremulous.   HEENT: Normocephalic. EOMI. No icterus or injection. Nares normal.   LUNGS:  Clear to auscultation. No dyspnea at rest.   HEART: Regular rate. Extremities perfused. port in left upper chest  ABDOMEN: Soft, nontender, and nondistended. Positive bowel sounds.   EXTREMITIES: No LE edema noted.   NEUROLOGIC: Moves extremities x4. Follows commands. Less tremulous.          Prior to Admission Medications:        Prescriptions Prior to Admission   Medication Sig Dispense Refill Last Dose     Atorvastatin Calcium (LIPITOR PO) Take 80 mg by mouth daily        BENZTROPINE MESYLATE PO Take 2 mg by mouth 2 times daily        carbidopa-levodopa (SINEMET)  MG per tablet Take 1.5 tablets by mouth 3 times daily         diphenoxylate-atropine (LOMOTIL) 2.5-0.025 MG per tablet Take 2 tablets by mouth 4 times daily as needed for diarrhea         LOSARTAN POTASSIUM PO Take 25 mg by mouth daily        Metoprolol Succinate (TOPROL XL PO) Take 100 mg by mouth daily        Morphine Sulfate (MS CONTIN PO) Take 15 mg by mouth 2 times daily         OMEPRAZOLE PO Take 40 mg by mouth every morning        Ondansetron (ZOFRAN ODT PO) Take 4 mg by mouth every 8 hours as needed         oxybutynin (DITROPAN-XL) 10 MG 24 hr tablet Take 10 mg by mouth daily        TAMSULOSIN HCL PO Take 0.4 mg by mouth At Bedtime        TRAMADOL HCL PO Take 50 mg by mouth 2 times daily as needed for headaches or moderate to severe pain        Acetaminophen (TYLENOL PO) Take 325 mg by mouth        ALLOPURINOL PO Take 300 mg by mouth daily        ALPRAZolam (XANAX PO) Take 0.25 mg by mouth 3 times daily as needed for anxiety        aspirin 325 MG tablet Take 325 mg by mouth daily        Clopidogrel Bisulfate (PLAVIX PO) Take 75 mg by mouth        Divalproex Sodium (DEPAKOTE PO) Take 500 mg by mouth daily        hydrochlorothiazide (MICROZIDE) 12.5 MG capsule Take 25 mg by mouth daily        HYDROcodone-acetaminophen (NORCO) 5-325 MG per tablet Take 1 tablet by mouth every 6 hours as needed for severe pain (1/2-1tab bid prn)         lactulose (CHRONULAC) 10 GM/15ML solution Take 30 g by mouth daily        Lansoprazole (PREVACID PO) Take 30 mg by mouth   5/19/2018 at Unknown time     MAGNESIUM OXIDE PO Take 100 mg by mouth daily        nicotine (NICODERM CQ) 21 MG/24HR 24 hr patch Place 1 patch onto the skin every 24 hours        sucralfate (CARAFATE) 1 GM tablet Take 1 g by mouth 4 times daily               Medications:        Current Facility-Administered Medications   Medication Last Rate     - MEDICATION INSTRUCTIONS -       sodium chloride 100 mL/hr at 05/20/18 2051     Current Facility-Administered Medications   Medication Dose Route Frequency     allopurinol (ZYLOPRIM) tablet 300 mg  300 mg Oral Daily     aspirin  81 mg Oral Daily     carbidopa-levodopa  1 tablet Oral TID    And     carbidopa-levodopa  1 half-tab Oral TID     clopidogrel  75 mg Oral Daily     lactulose  30 g Oral Daily     omeprazole (priLOSEC) CR capsule 40 mg  40 mg Oral QAM     rosuvastatin  40 mg Oral Daily     sodium chloride (PF)  3 mL Intracatheter Q8H     tamsulosin (FLOMAX) capsule 0.4 mg  0.4 mg Oral At Bedtime     Current Facility-Administered Medications   Medication Dose Route Frequency     acetaminophen  650 mg Rectal Q4H PRN     acetaminophen  650 mg Oral Q4H PRN     bisacodyl  10 mg Rectal Daily PRN     hydrALAZINE  10-20 mg Intravenous Q1H PRN     HYDROcodone-acetaminophen  1 tablet Oral Q6H PRN     labetalol  10-40 mg Intravenous Q10 Min PRN     lidocaine 4%   Topical Q1H PRN     lidocaine (buffered or not buffered)  1 mL Other Q1H PRN     LORazepam  0.5-1 mg Intravenous Q4H PRN     - MEDICATION INSTRUCTIONS -   Does not apply Continuous PRN     melatonin  1 mg Oral At Bedtime PRN     naloxone  0.1-0.4 mg Intravenous Q2 Min PRN     ondansetron  4 mg Oral Q6H PRN    Or     ondansetron  4 mg Intravenous Q6H PRN     oxymetazoline  2 spray Both Nostrils BID PRN     polyethylene glycol  17 g Oral Daily PRN     potassium chloride  20-40 mEq Oral or Feeding  Tube Q2H PRN     potassium chloride with lidocaine  10 mEq Intravenous Q1H PRN     potassium chloride  10 mEq Intravenous Q1H PRN     potassium chloride  20 mEq Intravenous Q1H PRN     potassium chloride  20-40 mEq Oral Q2H PRN     senna-docusate  1 tablet Oral BID PRN    Or     senna-docusate  2 tablet Oral BID PRN     sodium chloride (PF)  3 mL Intracatheter Q1H PRN            Data:      Lab data reviewed.     Recent Labs  Lab 05/20/18  0753 05/20/18  0100 05/19/18  1532   HGB 11.4*  --  11.5*   MCV 81  --  82     --  265     --  138   POTASSIUM 3.6 3.1* 3.3*   CHLORIDE 110*  --  105   CO2 22  --  23   BUN 11  --  16   CR 0.78  --  1.03   ANIONGAP 9  --  10   KENNEY 8.6  --  9.8   GLC 86 88 97   TROPI 0.423* 0.463*  --            Imaging:      Imaging data reviewed.     Dr. Tony Gupta D.O.  [facility]  Pager [phone]

## 2018-05-21 NOTE — PROGRESS NOTES
"CM    I:  SW met with patient to discuss transportation at time of discharge.  Per patient, he was a passenger in his truck on the way to [place], [place] last Friday.  The  brought him to [facility] and continued onto [address] to assist the patient's nephew, Luis Mars, with a move.  Per patient he does not know where his truck is today, as the  had planned to return to [place] yesterday.  Patient stated he planned to take an ambulance or medical transport home.  SW explained that Medicare would not pay for a non emergent ambulance ride or medical transport.  Either mode of transport would be out of pocket and range into the thousands.  Patient appeared to understand.  Patient then placed a call to his spouse, Irish, who stated patient \"could have had a ride home last night from another Nephew, [place], but patient had to stay there, so now we have a problem\".  Spouse stated she is upset with our hospital and the care patient is receiving.  SW attempted to explain we were not aware of the transportation issues until today.  Spouse then stated [place] may be able to pick patient up after work tonight (they live 250 miles away) but it would be later in the evening until he could possibly arrive.  Irish asks SW to update patient when he has been medically cleared so patient can let her know.  Spouse stated she will begin to make some calls to discuss possible transport options.    P:  Continue to assist with discharge planning as needed.    SHELLEY Ray    UPDATE@[id number]: Per staff RN, patient's spouse has arranged for transport home for patient tonight @approximately 1600 (8pm).  Patient confirms this w/SW.  No further SW interventions anticipated.   "

## 2018-05-21 NOTE — DISCHARGE SUMMARY
[facility]    Discharge Summary  Hospitalist    Date of Admission:  5/19/2018  Date of Discharge:  5/21/2018  Discharging Provider: Tony Gupta  Date of Service (when I saw the patient): 05/21/18    History of Present Illness   Brent Vega is a 73 year old male who presents with altered mental status.  The patient is unable to provide any history secondary to his encephalopathy, it is not clear how much of this is due to his underlying pathology vs receiving 3 mg ativan for agitation at outside ED.  Spoke with the patient's wife via phone, and she reports that he came up from [place], where they live, on Friday night to help their nephew move.  This morning, the nephew noticed that the patient had increased clumsiness.  Over the course of the morning, they noted that the clumsiness was increasing and he was becoming disoriented.  He was unable to remember where he was, however he got there and how he was going to get home.  Due to the confusion, they ultimately decided that they would try to drive him home, but while en route his confusion became more severe and they ultimately decided to present him for medical evaluation.  Of note, she reports about 1 month ago he became more confused at home, but this cleared after about 2 days and he returned to baseline.     The patient is able to participate some in review of systems.  He endorses dysarthria and some word-finding difficulty, denies any focal weakness or paresthesias.  Reports some mild abdominal pain in the periumbilical area, but it appears this is his chronic pain per his wife.  He denies any dyspnea, dysuria, sore throat, rash, nausea, diarrhea.  Denies any other complaints.    Hospital Course   Brent Vega was admitted on 5/19/2018.  The following problems were addressed during his hospitalization:    Brent Vega is a 73 year old male with past history CVA, CAD, HTN, hyperlipidemia, Parkinsons,  possible cognitive impairment, hx of lymphoma among other chronic medical conditions who transfers from [facility] with encephalopathy and possible CVA.        Acute ecephalopathy: Highest on differential is stroke.  Unclear how much of his confusion at arrival is related to 3mg lorazepam he received for agitation at outside ED.  Had fever at outside ED, so infectious source is also a strong consideration.  Note his outpatient Neurologist has documented low SLUMS score. Head CT with area of low density in left thalamus which may be stroke.  Also noted area of high density in left parietal region, recommended MRI for further evaluation.  - MRI head completed. No sign of acute stroke.   - Neurology followup.   - ASA and plavix recommended.   - Continue statin.       Hypokalemia:  K 3.3 at outside ED.  - replaced      Fever, resolved:  Febrile to 101.7 at outside ED.  CXR and UA unremarkable, blood cultures were not drawn.  No leukocytosis.  No infectious complaints (though limited reliability of ROS) and no findings on exam to suggest infectious source.  No meningeal signs on exam.  - Monitor as outpatient.       Hx of CVA:  Occurred about 2007.  No residual deficits per wife.  - Per Neurology.       CAD s/p PCI x2:  Details of placement unknown.  Stress test 4/2017 normal with EF 60-65%.  - Reinitiate antihypertensive therapy at discharge.       Parkinson's: Continue Sinemet and Cogentin.      Non-hodgkins lymphoma, in remission:  Last chemotherapy 2 years ago per wife.  No known recurrence.  - Monitor as outpatient.        Chronic abdominal pain: Stable.   - Hold PTA MS Contin   - Resumed PTA Adrian, instructed no driving on narcotics. Verbalized understanding.       BPH:  Flomax and hold oxybutinin.      Depression and anxiety: Continue home medication regimen, limit benzodiazepines, no driving under the influenze of narcotics or benzos, verbalized understanding.       GERD: PPI       Gout:  allopurinol        Tobacco dependence:  Encouraged cessation.        Code Status   Full Code       Primary Care Physician   ROGELIO GO    Physical Exam   Temp: 98.1  F (36.7  C) Temp src: Oral BP: 168/70 Pulse: 73 Heart Rate: 57 Resp: 18 SpO2: 97 % O2 Device: None (Room air)    Vitals:    05/20/18 0300   Weight: 77.4 kg (170 lb 10.2 oz)     Vital Signs with Ranges  Temp:  [97.9  F (36.6  C)-98.3  F (36.8  C)] 98.1  F (36.7  C)  Pulse:  [73] 73  Heart Rate:  [54-57] 57  Resp:  [16-18] 18  BP: (142-168)/(70-94) 168/70  SpO2:  [97 %] 97 %  I/O last 3 completed shifts:  In: 3898 [P.O.:1420; I.V.:2478]  Out: -     GENERAL: Alert and oriented. NAD. Conversational, appropriate. Less tremulous.   HEENT: Normocephalic. EOMI. No icterus or injection. Nares normal.   LUNGS: Clear to auscultation. No dyspnea at rest.   HEART: Regular rate. Extremities perfused. port in left upper chest  ABDOMEN: Soft, nontender, and nondistended. Positive bowel sounds.   EXTREMITIES: No LE edema noted.   NEUROLOGIC: Moves extremities x4. Follows commands. Less tremulous.     Discharge Disposition   Discharged to home  Condition at discharge: Stable    Consultations This Hospital Stay   NEUROLOGY IP CONSULT  PHYSICAL THERAPY ADULT IP CONSULT  OCCUPATIONAL THERAPY ADULT IP CONSULT  SPEECH LANGUAGE PATH ADULT IP CONSULT  SWALLOW EVAL SPEECH PATH AT BEDSIDE IP CONSULT  SMOKING CESSATION PROGRAM IP CONSULT    Time Spent on this Encounter   I, Tony Gupta, personally saw the patient today and spent greater than 30 minutes discharging this patient.    Discharge Orders     Physical Therapy Referral     Reason for your hospital stay   Confusion.     Follow-up and recommended labs and tests    Follow up with primary care provider, ROGELIO GO, within 7 days for hospital follow- up.  The following labs/tests are recommended: cbc/cmp.    Follow up with Neurology as directed, next available.     Activity   Your activity upon discharge: activity as tolerated      Full Code     Diet   Follow this diet upon discharge: Orders Placed This Encounter     Regular diet       Discharge Medications   Current Discharge Medication List      START taking these medications    Details   aspirin 81 MG chewable tablet Take 1 tablet (81 mg) by mouth daily  Qty: 36 tablet, Refills: 0    Associated Diagnoses: Encephalopathy      clopidogrel (PLAVIX) 75 MG tablet Take 1 tablet (75 mg) by mouth daily  Qty: 30 tablet, Refills: 0    Associated Diagnoses: Encephalopathy      rosuvastatin (CRESTOR) 40 MG tablet Take 1 tablet (40 mg) by mouth daily  Qty: 30 tablet, Refills: 0    Associated Diagnoses: Encephalopathy         CONTINUE these medications which have NOT CHANGED    Details   Acetaminophen (TYLENOL PO) Take 325 mg by mouth every 6 hours as needed       ALLOPURINOL PO Take 300 mg by mouth daily      BENZTROPINE MESYLATE PO Take 2 mg by mouth 2 times daily      carbidopa-levodopa (SINEMET)  MG per tablet Take 1.5 tablets by mouth 3 times daily       hydrochlorothiazide (MICROZIDE) 12.5 MG capsule Take 25 mg by mouth daily      HYDROcodone-acetaminophen (NORCO) 5-325 MG per tablet Take 1 tablet by mouth every 6 hours as needed for severe pain (1/2-1tab bid prn)      lactulose (CHRONULAC) 10 GM/15ML solution Take 30 g by mouth daily      Lansoprazole (PREVACID PO) Take 30 mg by mouth every morning (before breakfast)       LOSARTAN POTASSIUM PO Take 25 mg by mouth daily      MAGNESIUM OXIDE PO Take 100 mg by mouth daily      Metoprolol Succinate (TOPROL XL PO) Take 100 mg by mouth daily      nicotine (NICODERM CQ) 21 MG/24HR 24 hr patch Place 1 patch onto the skin every 24 hours      Ondansetron (ZOFRAN ODT PO) Take 4 mg by mouth every 8 hours as needed       oxybutynin (DITROPAN-XL) 10 MG 24 hr tablet Take 10 mg by mouth daily      sucralfate (CARAFATE) 1 GM tablet Take 1 g by mouth 4 times daily      TAMSULOSIN HCL PO Take 0.4 mg by mouth At Bedtime      TRAMADOL HCL PO Take 50 mg by  mouth 2 times daily as needed for headaches or moderate to severe pain         STOP taking these medications       ALPRAZolam (XANAX PO) Comments:   Reason for Stopping:         aspirin 325 MG tablet Comments:   Reason for Stopping:         Atorvastatin Calcium (LIPITOR PO) Comments:   Reason for Stopping:         diphenoxylate-atropine (LOMOTIL) 2.5-0.025 MG per tablet Comments:   Reason for Stopping:         Morphine Sulfate (MS CONTIN PO) Comments:   Reason for Stopping:         OMEPRAZOLE PO Comments:   Reason for Stopping:             Allergies   Allergies   Allergen Reactions     Chantix [Varenicline] Unknown     Gabapentin Unknown     Data   Most Recent 3 CBC's:  Recent Labs   Lab Test  05/21/18   0820  05/20/18   0753  05/19/18   1532   WBC  5.7  6.4  8.4   HGB  11.9*  11.4*  11.5*   MCV  82  81  82   PLT  231  229  265      Most Recent 3 BMP's:  Recent Labs   Lab Test  05/21/18   0820  05/20/18   0753  05/20/18   0100  05/19/18   1532   NA  142  141   --   138   POTASSIUM  3.4  3.6  3.1*  3.3*   CHLORIDE  111*  110*   --   105   CO2  21  22   --   23   BUN  8  11   --   16   CR  0.70  0.78   --   1.03   ANIONGAP  10  9   --   10   KENNEY  8.8  8.6   --   9.8   GLC  139*  86  88  97     Most Recent 2 LFT's:  Recent Labs   Lab Test  05/21/18   0820  05/20/18   0100   AST  20  17   ALT  8  12   ALKPHOS  94  95   BILITOTAL  0.6  0.7     Most Recent INR's and Anticoagulation Dosing History:  Anticoagulation Dose History     There is no flowsheet data to display.        Most Recent 3 Troponin's:  Recent Labs   Lab Test  05/21/18   0820  05/20/18   0753  05/20/18   0100   TROPI  0.433*  0.423*  0.463*     Most Recent Cholesterol Panel:  Recent Labs   Lab Test  05/20/18   0753   CHOL  200*   LDL  138*   HDL  30*   TRIG  158*     Most Recent 6 Bacteria Isolates From Any Culture (See EPIC Reports for Culture Details):No lab results found.  Most Recent TSH, T4 and A1c Labs:  Recent Labs   Lab Test  05/20/18   0100   A1C   5.6     Results for orders placed or performed during the hospital encounter of 05/19/18   MRA Head w/o Contrast Angiogram    Narrative    MR ANGIOGRAM OF THE HEAD WITHOUT CONTRAST   5/20/2018 10:43 AM     HISTORY: Possible stroke.    TECHNIQUE: 3D time-of-flight MR angiogram of the head without  contrast.    COMPARISON: None.    FINDINGS: Imaging is mildly degraded by motion artifact. Given the  limitations, the major intracranial arteries including the proximal  branches of the anterior cerebral, middle cerebral, and posterior  cerebral arteries appear patent without vascular cutoff. No aneurysm  identified. No significant stenosis.      Impression    IMPRESSION: Mildly motion degraded images. No definite vascular cutoff  of the proximal major intracranial arteries.        SEYMOUR MCCORMICK MD   MRI Brain w & w/o contrast    Narrative    MRI BRAIN WITHOUT AND WITH CONTRAST  5/20/2018 2:54 PM    HISTORY:  Stroke with SWI sequence.     TECHNIQUE:  Multiplanar, multisequence MRI of the brain without and  with 8 mL Gadavist.    COMPARISON: None.    FINDINGS: Diffusion-weighted images are normal. There is no evidence  for any acute hemorrhage. There is some hemosiderin in the left  parietal region in a periventricular and medial location. Exam is  slightly limited by motion artifact. A few scattered nonspecific white  matter changes are present. There is also some cerebral atrophy.  Vascular structures are patent at the skull base. Postcontrast images  do not show any abnormal areas of enhancement or any focal mass  lesions.      Impression    IMPRESSION:  1. Exam limited by motion artifact.  2. Area of hemosiderin in left parietal region corresponding to  high-density lesion on CT performed on 5/19/2018. This is not  associated with any vascular nidus or enlarged vessels and hence is  not felt to be due to a high flow vascular malformation. The lesion  may be due to a cavernoma or an old area of granulomatous change.  If  symptoms persist or progress, follow-up exam in 3 months might be  helpful in further evaluation.  3. Scattered nonspecific white matter changes.  4. No evidence for acute hemorrhage, acute infarct, or any focal mass  lesions.      DENILSON HUNTER MD   US Carotid Bilateral    Narrative    BILATERAL CAROTID ULTRASOUND   5/20/2018 2:06 PM     HISTORY: Suspected stroke.     COMPARISON: None.    RIGHT CAROTID FINDINGS:  Minimal mixed plaque.  Right ICA PSV:  86 cm/sec.  Right ICA EDV:  26 cm/sec.  Right ICA/CCA PSV Ratio:  1.2    These indicate less than 50% diameter stenosis of the right ICA.    Right Vertebral: Antegrade flow.   Right ECA: Antegrade flow.     LEFT CAROTID FINDINGS:  Minimal mixed plaque.  Left ICA PSV:  90 cm/sec.  Left ICA EDV:  28 cm/sec.  Left ICA/CCA PSV Ratio:  1.2    These indicate less than 50% diameter stenosis of the left ICA.    Left Vertebral: Antegrade flow.   Left ECA: Antegrade flow.     Causes of Decreased Accuracy:   None.       Impression    IMPRESSION:    1. Less than 50% diameter stenosis of the right ICA relative to the  distal ICA diameter.  2. Less than 50% diameter stenosis of the left ICA relative to the  distal ICA diameter.    DENILSON HUNTER MD

## 2018-05-21 NOTE — PROGRESS NOTES
Pt states he does not need to go home with medications that were filled at the [facility] pharmacy. RN verified with pt's wife Jim that pt does indeed have a supply of his medications at home including plavix and crestor; per wife, pt had been taking a 325 mg ASA daily but she also has 81 mg ASA in the house for pt to switch to. RN will return meds for credit.

## 2018-05-22 LAB — INTERPRETATION ECG - MUSE: NORMAL

## 2018-05-22 NOTE — PROGRESS NOTES
Pt A&O x4, has tremors but other neuros intact. Up SBA, VSS. Discharge paperwork and education completed. Picked up by nephew and D/C'd home w/ OP PT at 2240.

## 2018-05-22 NOTE — PLAN OF CARE
Problem: Patient Care Overview  Goal: Plan of Care/Patient Progress Review  Outcome: Adequate for Discharge Date Met: 05/22/18  Speech Language Therapy Discharge Summary    Reason for therapy discharge:    Discharged to home with outpatient therapy.    Progress towards therapy goal(s). See goals on Care Plan in [facility] electronic health record for goal details.  Goals met    Therapy recommendation(s):    No further therapy is recommended. Patient discharged on a regular diet and thin liquids.

## 2018-05-22 NOTE — PLAN OF CARE
Problem: Patient Care Overview  Goal: Plan of Care/Patient Progress Review  Outcome: Adequate for Discharge Date Met: 05/21/18  A and O x4. No pain. BP elevated, VS otherwise stable. Tele reading SR with BBB.  Up with SBA and walker, unsteady gait and tremors. Neuros otherwise intact. Pt awaiting pickup by nephew to return to [place] where he will follow up with his PCP and neurologist.

## 2019-11-25 NOTE — PLAN OF CARE
10 7 19 RESOLVED. Problem: Patient Care Overview  Goal: Plan of Care/Patient Progress Review  Outcome: Improving  Neuros stable.  Disorientated to exact place.  Bilateral UEs tremors.  Denies pain.  Tele NSR with BBB.  C/o stuffy nose and restlessness in MRI, nasal spray given.  Ativan 1mg given for MRI at approx 1420.  Up with assist 1 to bathroom.  DC plans pending.  Pt is from [place].

## (undated) RX ORDER — ACETAMINOPHEN 325 MG/1
TABLET ORAL
Status: DISPENSED
Start: 2018-05-19

## (undated) RX ORDER — SODIUM CHLORIDE 9 MG/ML
INJECTION, SOLUTION INTRAVENOUS
Status: DISPENSED
Start: 2018-05-19

## (undated) RX ORDER — LORAZEPAM 2 MG/ML
INJECTION INTRAMUSCULAR
Status: DISPENSED
Start: 2018-05-19

## (undated) RX ORDER — POTASSIUM CHLORIDE 7.45 MG/ML
INJECTION INTRAVENOUS
Status: DISPENSED
Start: 2018-05-19